# Patient Record
Sex: MALE | Race: WHITE | URBAN - METROPOLITAN AREA
[De-identification: names, ages, dates, MRNs, and addresses within clinical notes are randomized per-mention and may not be internally consistent; named-entity substitution may affect disease eponyms.]

---

## 2023-09-18 ENCOUNTER — OFFICE VISIT (OUTPATIENT)
Dept: FAMILY MEDICINE CLINIC | Age: 68
End: 2023-09-18

## 2023-09-18 VITALS
BODY MASS INDEX: 22.51 KG/M2 | OXYGEN SATURATION: 98 % | WEIGHT: 152 LBS | HEIGHT: 69 IN | DIASTOLIC BLOOD PRESSURE: 70 MMHG | HEART RATE: 78 BPM | SYSTOLIC BLOOD PRESSURE: 130 MMHG

## 2023-09-18 DIAGNOSIS — Z00.00 WELLNESS EXAMINATION: ICD-10-CM

## 2023-09-18 DIAGNOSIS — N18.6 ESRD (END STAGE RENAL DISEASE) (HCC): ICD-10-CM

## 2023-09-18 DIAGNOSIS — R33.9 URINARY RETENTION WITH INCOMPLETE BLADDER EMPTYING: Primary | ICD-10-CM

## 2023-09-18 DIAGNOSIS — I10 PRIMARY HYPERTENSION: ICD-10-CM

## 2023-09-18 PROCEDURE — 3078F DIAST BP <80 MM HG: CPT | Performed by: STUDENT IN AN ORGANIZED HEALTH CARE EDUCATION/TRAINING PROGRAM

## 2023-09-18 PROCEDURE — 1123F ACP DISCUSS/DSCN MKR DOCD: CPT | Performed by: STUDENT IN AN ORGANIZED HEALTH CARE EDUCATION/TRAINING PROGRAM

## 2023-09-18 PROCEDURE — 3075F SYST BP GE 130 - 139MM HG: CPT | Performed by: STUDENT IN AN ORGANIZED HEALTH CARE EDUCATION/TRAINING PROGRAM

## 2023-09-18 PROCEDURE — 99203 OFFICE O/P NEW LOW 30 MIN: CPT | Performed by: STUDENT IN AN ORGANIZED HEALTH CARE EDUCATION/TRAINING PROGRAM

## 2023-09-18 RX ORDER — SEVELAMER CARBONATE 800 MG/1
TABLET, FILM COATED ORAL
COMMUNITY
Start: 2023-09-09

## 2023-09-18 RX ORDER — NIFEDIPINE 90 MG/1
90 TABLET, FILM COATED, EXTENDED RELEASE ORAL DAILY
COMMUNITY
Start: 2023-09-14

## 2023-09-18 RX ORDER — TAMSULOSIN HYDROCHLORIDE 0.4 MG/1
CAPSULE ORAL DAILY
COMMUNITY
Start: 2023-09-14

## 2023-09-18 RX ORDER — CARVEDILOL 6.25 MG/1
6.25 TABLET ORAL 2 TIMES DAILY
COMMUNITY
Start: 2023-09-15

## 2023-09-18 ASSESSMENT — ANXIETY QUESTIONNAIRES
4. TROUBLE RELAXING: 1
GAD7 TOTAL SCORE: 1
6. BECOMING EASILY ANNOYED OR IRRITABLE: 0
1. FEELING NERVOUS, ANXIOUS, OR ON EDGE: 0
IF YOU CHECKED OFF ANY PROBLEMS ON THIS QUESTIONNAIRE, HOW DIFFICULT HAVE THESE PROBLEMS MADE IT FOR YOU TO DO YOUR WORK, TAKE CARE OF THINGS AT HOME, OR GET ALONG WITH OTHER PEOPLE: NOT DIFFICULT AT ALL
7. FEELING AFRAID AS IF SOMETHING AWFUL MIGHT HAPPEN: 0
5. BEING SO RESTLESS THAT IT IS HARD TO SIT STILL: 0
2. NOT BEING ABLE TO STOP OR CONTROL WORRYING: 0
3. WORRYING TOO MUCH ABOUT DIFFERENT THINGS: 0

## 2023-09-18 ASSESSMENT — PATIENT HEALTH QUESTIONNAIRE - PHQ9
2. FEELING DOWN, DEPRESSED OR HOPELESS: 1
1. LITTLE INTEREST OR PLEASURE IN DOING THINGS: 1
SUM OF ALL RESPONSES TO PHQ9 QUESTIONS 1 & 2: 2
SUM OF ALL RESPONSES TO PHQ QUESTIONS 1-9: 2

## 2023-09-18 ASSESSMENT — ENCOUNTER SYMPTOMS
SHORTNESS OF BREATH: 0
DIARRHEA: 0
CONSTIPATION: 0
COUGH: 0

## 2023-09-22 ENCOUNTER — CARE COORDINATION (OUTPATIENT)
Dept: CARE COORDINATION | Age: 68
End: 2023-09-22

## 2023-09-22 NOTE — CARE COORDINATION
ACM outreach to patient per PCP referral to offer CC and discuss the role of the ACM. Patient very friendly and notes he is doing well with no concerns. Has not picked up a BP monitor yet but plans to do so. Patient was asked to keep a record of recordings, take BP one hour after taking medication. Patient VU. Patient denied any HA, dizziness, CP, pressure, N/V. Taking medication as prescribed. Patient denied any needs or concerns to be addressed by ACM. Patient encouraged to outreach should any needs arise.

## 2023-12-05 ENCOUNTER — PATIENT MESSAGE (OUTPATIENT)
Dept: FAMILY MEDICINE CLINIC | Age: 68
End: 2023-12-05

## 2023-12-20 DIAGNOSIS — Z00.00 WELLNESS EXAMINATION: ICD-10-CM

## 2023-12-20 LAB
ALBUMIN SERPL-MCNC: 4.7 G/DL (ref 3.4–5)
ALBUMIN/GLOB SERPL: 1.8 {RATIO} (ref 1.1–2.2)
ALP SERPL-CCNC: 103 U/L (ref 40–129)
ALT SERPL-CCNC: 8 U/L (ref 10–40)
ANION GAP SERPL CALCULATED.3IONS-SCNC: 15 MMOL/L (ref 3–16)
AST SERPL-CCNC: 9 U/L (ref 15–37)
BILIRUB SERPL-MCNC: 0.3 MG/DL (ref 0–1)
BUN SERPL-MCNC: 33 MG/DL (ref 7–20)
CALCIUM SERPL-MCNC: 9.3 MG/DL (ref 8.3–10.6)
CHLORIDE SERPL-SCNC: 95 MMOL/L (ref 99–110)
CHOLEST SERPL-MCNC: 161 MG/DL (ref 0–199)
CO2 SERPL-SCNC: 26 MMOL/L (ref 21–32)
CREAT SERPL-MCNC: 5.5 MG/DL (ref 0.8–1.3)
GFR SERPLBLD CREATININE-BSD FMLA CKD-EPI: 11 ML/MIN/{1.73_M2}
GLUCOSE SERPL-MCNC: 97 MG/DL (ref 70–99)
HDLC SERPL-MCNC: 37 MG/DL (ref 40–60)
LDLC SERPL CALC-MCNC: 92 MG/DL
POTASSIUM SERPL-SCNC: 4.3 MMOL/L (ref 3.5–5.1)
PROT SERPL-MCNC: 7.3 G/DL (ref 6.4–8.2)
PSA SERPL DL<=0.01 NG/ML-MCNC: 11.97 NG/ML (ref 0–4)
SODIUM SERPL-SCNC: 136 MMOL/L (ref 136–145)
TRIGL SERPL-MCNC: 159 MG/DL (ref 0–150)
VLDLC SERPL CALC-MCNC: 32 MG/DL

## 2023-12-21 ENCOUNTER — TELEPHONE (OUTPATIENT)
Dept: FAMILY MEDICINE CLINIC | Age: 68
End: 2023-12-21

## 2023-12-21 LAB
EST. AVERAGE GLUCOSE BLD GHB EST-MCNC: 102.5 MG/DL
HBA1C MFR BLD: 5.2 %

## 2023-12-21 NOTE — TELEPHONE ENCOUNTER
Critical lab result from [facility]    Creatinine: 5.5     Please advise in PCP absence

## 2023-12-21 NOTE — TELEPHONE ENCOUNTER
There is no new concern about this piece of lab work. The patient has end-stage renal disease and is on dialysis and we often see these creatinine values in individuals on dialysis.

## 2023-12-26 ENCOUNTER — CLINICAL DOCUMENTATION (OUTPATIENT)
Dept: SPIRITUAL SERVICES | Age: 68
End: 2023-12-26

## 2023-12-26 ENCOUNTER — PATIENT MESSAGE (OUTPATIENT)
Dept: SPIRITUAL SERVICES | Age: 68
End: 2023-12-26

## 2023-12-26 NOTE — ACP (ADVANCE CARE PLANNING)
Advance Care Planning   Ambulatory ACP Specialist Patient Outreach    Date:  12/26/2023    ACP Specialist:  MARKIE Calderon    Outreach call to patient in follow-up to ACP Specialist referral Gilda Molina MD    [x] PCP  [x] Provider   [] Ambulatory Care Management [] Other     For:                  [x] Advance Directive Assistance              [] Complete Portable DNR order              [] Complete POST/POLST/MOST              [] Code Status Discussion             [] Discuss Goals of Care             [] Early ACP Decision-Making              [] Other (Specify)    Date Referral Received:12/22/2023    Next Step:   [] ACP scheduled conversation  [x] Outreach again in one week               [] Email / Mail [address]  [] Email / Mail Advance Directive   [] Closing referral.  Routing closure to referring provider/staff and to ACP Specialist . [] Closure letter mailed to patient with invitation to contact ACP Specialist if / when ready. [] Other (Specify here):         [x] At this time, Healthcare Decision Maker Is:  Advance Care Planning   Healthcare Decision Maker:    Primary Decision Maker: Daniel Burton [facility] - [phone]    Click here to complete Healthcare Decision Makers including selection of the Healthcare Decision Maker Relationship (ie \"Primary\"). [] Primary agent named in scanned advance directive. [x] Legal Next of Kin. [] Unable to determine legal decision maker at this time. Outreaches:       [x] 1st -  Date:  12/26/2023               Intervention:  [] Spoke with Patient   [] Left Voice mail [] Email / Mail    [x] MyChart  [x] Other (Specify): Line Disconnected After Answered      Outcomes: This Coordinator attempted to reach the pt as the listed primary phone number (mobile), were the line was answered and then disconnected. The listed home number is the pts daughter-in-law and was not called.  A [place] message has been sent offering

## 2023-12-28 ENCOUNTER — PATIENT MESSAGE (OUTPATIENT)
Dept: FAMILY MEDICINE CLINIC | Age: 68
End: 2023-12-28

## 2023-12-28 NOTE — TELEPHONE ENCOUNTER
[facility]   [address], [address], [address]   Phone: [phone]   Fax: [phone]     Called patient and made him aware of referral information and gave him phone number to call [facility]. Suggested he request appointment for first available, and explain his situation.

## 2024-01-10 DIAGNOSIS — Z01.818 PREOP TESTING: ICD-10-CM

## 2024-01-10 DIAGNOSIS — N18.6 END STAGE RENAL DISEASE (HCC): Primary | ICD-10-CM

## 2024-01-19 ENCOUNTER — TELEPHONE (OUTPATIENT)
Dept: VASCULAR SURGERY | Age: 69
End: 2024-01-19

## 2024-01-19 NOTE — TELEPHONE ENCOUNTER
Called patient as he did not show up for his vein mapping today. Need to reschedule vein mapping and apt to see Dr Hauser. Phillip

## 2024-01-19 NOTE — TELEPHONE ENCOUNTER
Pt's daughter-in-law called to reschedule pt for his vein mapping. Best callback number is [phone].       Please advise.

## 2024-01-22 ENCOUNTER — TELEPHONE (OUTPATIENT)
Dept: FAMILY MEDICINE CLINIC | Age: 69
End: 2024-01-22

## 2024-01-22 ENCOUNTER — OFFICE VISIT (OUTPATIENT)
Dept: FAMILY MEDICINE CLINIC | Age: 69
End: 2024-01-22
Payer: MEDICARE

## 2024-01-22 ENCOUNTER — TELEPHONE (OUTPATIENT)
Dept: VASCULAR SURGERY | Age: 69
End: 2024-01-22

## 2024-01-22 VITALS
SYSTOLIC BLOOD PRESSURE: 138 MMHG | TEMPERATURE: 97.8 F | HEIGHT: 69 IN | WEIGHT: 170.6 LBS | DIASTOLIC BLOOD PRESSURE: 74 MMHG | OXYGEN SATURATION: 97 % | BODY MASS INDEX: 25.27 KG/M2 | HEART RATE: 100 BPM

## 2024-01-22 DIAGNOSIS — B02.30 HERPES ZOSTER WITH OPHTHALMIC COMPLICATION, UNSPECIFIED HERPES ZOSTER EYE DISEASE: Primary | ICD-10-CM

## 2024-01-22 PROCEDURE — 1123F ACP DISCUSS/DSCN MKR DOCD: CPT | Performed by: STUDENT IN AN ORGANIZED HEALTH CARE EDUCATION/TRAINING PROGRAM

## 2024-01-22 PROCEDURE — 99214 OFFICE O/P EST MOD 30 MIN: CPT | Performed by: STUDENT IN AN ORGANIZED HEALTH CARE EDUCATION/TRAINING PROGRAM

## 2024-01-22 PROCEDURE — G8484 FLU IMMUNIZE NO ADMIN: HCPCS | Performed by: STUDENT IN AN ORGANIZED HEALTH CARE EDUCATION/TRAINING PROGRAM

## 2024-01-22 PROCEDURE — 3017F COLORECTAL CA SCREEN DOC REV: CPT | Performed by: STUDENT IN AN ORGANIZED HEALTH CARE EDUCATION/TRAINING PROGRAM

## 2024-01-22 PROCEDURE — 1036F TOBACCO NON-USER: CPT | Performed by: STUDENT IN AN ORGANIZED HEALTH CARE EDUCATION/TRAINING PROGRAM

## 2024-01-22 PROCEDURE — G8419 CALC BMI OUT NRM PARAM NOF/U: HCPCS | Performed by: STUDENT IN AN ORGANIZED HEALTH CARE EDUCATION/TRAINING PROGRAM

## 2024-01-22 PROCEDURE — G8427 DOCREV CUR MEDS BY ELIG CLIN: HCPCS | Performed by: STUDENT IN AN ORGANIZED HEALTH CARE EDUCATION/TRAINING PROGRAM

## 2024-01-22 RX ORDER — VALACYCLOVIR HYDROCHLORIDE 1 G/1
1000 TABLET, FILM COATED ORAL DAILY
Qty: 5 TABLET | Refills: 0 | Status: SHIPPED | OUTPATIENT
Start: 2024-01-22 | End: 2024-01-27

## 2024-01-22 ASSESSMENT — PATIENT HEALTH QUESTIONNAIRE - PHQ9
SUM OF ALL RESPONSES TO PHQ QUESTIONS 1-9: 2
1. LITTLE INTEREST OR PLEASURE IN DOING THINGS: 1
2. FEELING DOWN, DEPRESSED OR HOPELESS: 1
1. LITTLE INTEREST OR PLEASURE IN DOING THINGS: SEVERAL DAYS
SUM OF ALL RESPONSES TO PHQ QUESTIONS 1-9: 2
SUM OF ALL RESPONSES TO PHQ9 QUESTIONS 1 & 2: 2
SUM OF ALL RESPONSES TO PHQ QUESTIONS 1-9: 2
SUM OF ALL RESPONSES TO PHQ9 QUESTIONS 1 & 2: 2
SUM OF ALL RESPONSES TO PHQ QUESTIONS 1-9: 2
2. FEELING DOWN, DEPRESSED OR HOPELESS: SEVERAL DAYS

## 2024-01-22 ASSESSMENT — ENCOUNTER SYMPTOMS
CONSTIPATION: 0
COUGH: 0
SHORTNESS OF BREATH: 0
DIARRHEA: 0
FACIAL SWELLING: 1

## 2024-01-22 NOTE — PROGRESS NOTES
[facility]  [address],  [place] [address]    Assessment/Plan:    Milan was seen today for shoulder pain, otalgia and other.    Diagnoses and all orders for this visit:    Herpes zoster with ophthalmic complication, unspecified herpes zoster eye disease  -     Cancel: Mazin Ferrari MD, Ophthalmology, [facility]  -     valACYclovir (VALTREX) 1 g tablet; Take 1 tablet by mouth daily for 5 days  -     Mazin Ferrari MD, Ophthalmology, [facility]    Patient with clinical findings concerning for herpes zoster with possible ophthalmic complication given dermatomal distribution and history.  Patient high risk given end-stage renal disease on dialysis.  Provided appropriate medical education for the above condition in addition to urgency for treatment and follow-up with ophthalmology given risk of vision loss.  Antibiotic treatment dosed per renal function.  Strongly encourage patient to reach out to nephrologist for any additional titrations to medication dose if needed.  For now last GFR of 11 was used from patient's blood work in December 2023.  Patient to have close follow-up to monitor progress.    Return in about 2 weeks (around 2/5/2024) for Return for Shoulder, Hips, Scapula, Med check. Left shoulder pain. Bilateral hips (L>R),.     Patient: Milan Peres is a pleasant 68 y.o.male who presents today for:      Chief Complaint   Patient presents with    Shoulder Pain     Right shoulder pain    Otalgia     Right ear pain x 1 day    Other     Rash on head, and right eye swollen, started this a.m.       HPI:     Left scalp and face pain:  Patient reports symptoms of left scalp rash.  Symptoms began approximately 2-3 days ago.  Reports associated complaints of  shooting pain behind the ear and maxillary area. Also with throat irritation and facial swelling. Denies any symptoms of vision changes though with swelling to right periorbital area.    Patient's past

## 2024-01-22 NOTE — TELEPHONE ENCOUNTER
Called and spoke to Hayley daughter-n-law regarding rescheduling apt for vm and apt to see Dr Hauser. Apt for vein mapping is Friday 2/2/25 at [facility] at 9:00am and apt to see Dr Hauser is 10:15am. Phillip

## 2024-01-22 NOTE — TELEPHONE ENCOUNTER
Called [facility] per Dr Morgan, requesting patient be seen today for Shingles onset, right eye.  Spoke to Minda @ [phone] and gave patient information and faxed referral with [facility] to [phone]. Made patient aware, triage Rn would be contacting him to schedule for first available.

## 2024-01-23 ENCOUNTER — TELEPHONE (OUTPATIENT)
Dept: FAMILY MEDICINE CLINIC | Age: 69
End: 2024-01-23

## 2024-01-23 DIAGNOSIS — B02.30 HERPES ZOSTER WITH OPHTHALMIC COMPLICATION, UNSPECIFIED HERPES ZOSTER EYE DISEASE: Primary | ICD-10-CM

## 2024-01-23 RX ORDER — TRAMADOL HYDROCHLORIDE 50 MG/1
50 TABLET ORAL EVERY 12 HOURS PRN
Qty: 14 TABLET | Refills: 0 | Status: SHIPPED | OUTPATIENT
Start: 2024-01-23 | End: 2024-01-30

## 2024-01-23 RX ORDER — LIDOCAINE 3% TOPICAL ANESTHETIC CREAM 0.03 G/G
1 CREAM TOPICAL 3 TIMES DAILY
Qty: 35 G | Refills: 0 | Status: SHIPPED | OUTPATIENT
Start: 2024-01-23 | End: 2024-02-06

## 2024-01-23 NOTE — TELEPHONE ENCOUNTER
Hayley called back, she states she is a pharmaceutical technician, and has used some of her own lidocaine and acyclovir ointment and mixed together and applied topically for patient, to help with pain. The ointment did give him some relief, so asking for rx for patient. She states he is in a lot of pain, and usually \"never complains\".  Tylenol is not helping, cannot take ibuprofen.   [facility]

## 2024-01-23 NOTE — TELEPHONE ENCOUNTER
Spoke to patient's daughter-in-law.  Will send in prescription for tramadol and lidocaine topical for pain control.  Tramadol renally adjusted to be taken every 12 hours.  Patient daughter-in-law reported that patient was seen by ophthalmology yesterday and advised eye symptoms monitoring for now with ED precautions provided.  Thanks!

## 2024-01-23 NOTE — TELEPHONE ENCOUNTER
Pt's daughter in law, Hayley called to ask if Dr. Morgan will call in a RX for lidocaine and Acyclovir. Pt is suffering from a terrible bout of shingles. Hayley stated that the shingles have gotten into his eye, and he is in a lot of pain due to this. Hayley is also asking for some pain meds for Milan. She stated that he is in end stage renal failure, and there is a med he is on for that, Renvela,  that she feels he would benefit from if it was a stronger dose. Hayley is a pharmacist and asks for a phone call to explain in more detail if needed. [phone].

## 2024-02-02 ENCOUNTER — TELEPHONE (OUTPATIENT)
Dept: FAMILY MEDICINE CLINIC | Age: 69
End: 2024-02-02

## 2024-02-02 DIAGNOSIS — Z00.00 ENCOUNTER FOR SCREENING AND PREVENTATIVE CARE: ICD-10-CM

## 2024-02-02 RX ORDER — ATORVASTATIN CALCIUM 20 MG/1
20 TABLET, FILM COATED ORAL DAILY
Qty: 90 TABLET | Refills: 3 | Status: SHIPPED | OUTPATIENT
Start: 2024-02-02

## 2024-02-02 NOTE — TELEPHONE ENCOUNTER
Last Office Visit  -  1/22/24  Next Office Visit  -  2/5/24    Last Filled  -  12/22/23  Last UDS -    Contract -

## 2024-02-02 NOTE — TELEPHONE ENCOUNTER
Nolvia from [facility] called requesting a refill on this medication - please add refills:    atorvastatin (LIPITOR) 20 MG tablet [[id number]      [facility]/pharmacy #[id number] - [place], [place] - [address] - P [phone] - F [phone] [address] [facility] [address]   Phone:  [phone]  Fax:  [phone]

## 2024-02-05 ENCOUNTER — OFFICE VISIT (OUTPATIENT)
Dept: FAMILY MEDICINE CLINIC | Age: 69
End: 2024-02-05
Payer: COMMERCIAL

## 2024-02-05 VITALS
DIASTOLIC BLOOD PRESSURE: 82 MMHG | SYSTOLIC BLOOD PRESSURE: 130 MMHG | OXYGEN SATURATION: 98 % | WEIGHT: 170 LBS | HEART RATE: 102 BPM | HEIGHT: 69 IN | BODY MASS INDEX: 25.18 KG/M2

## 2024-02-05 DIAGNOSIS — M70.62 GREATER TROCHANTERIC BURSITIS OF LEFT HIP: Primary | ICD-10-CM

## 2024-02-05 DIAGNOSIS — B02.30 HERPES ZOSTER WITH OPHTHALMIC COMPLICATION, UNSPECIFIED HERPES ZOSTER EYE DISEASE: ICD-10-CM

## 2024-02-05 PROCEDURE — 99214 OFFICE O/P EST MOD 30 MIN: CPT | Performed by: STUDENT IN AN ORGANIZED HEALTH CARE EDUCATION/TRAINING PROGRAM

## 2024-02-05 PROCEDURE — 1123F ACP DISCUSS/DSCN MKR DOCD: CPT | Performed by: STUDENT IN AN ORGANIZED HEALTH CARE EDUCATION/TRAINING PROGRAM

## 2024-02-05 RX ORDER — LIDOCAINE HYDROCHLORIDE 30 MG/G
CREAM TOPICAL 3 TIMES DAILY
COMMUNITY
Start: 2024-01-30

## 2024-02-05 ASSESSMENT — ENCOUNTER SYMPTOMS
COUGH: 0
DIARRHEA: 0
CONSTIPATION: 0
SHORTNESS OF BREATH: 0

## 2024-02-05 NOTE — PROGRESS NOTES
[facility]  [address],  [place] [address]    Assessment/Plan:    Milan was seen today for hip pain, shoulder pain and medication check.    Diagnoses and all orders for this visit:    Greater trochanteric bursitis of left hip  Mild to moderate symptoms of greater trochanteric pain syndrome that affecting patient's quality of life.  Discussed that we will schedule appointment for corticosteroid injection to left hip which bothers patient more.  > 10 minutes spent discussing pathophysiology of above medical condition and appropriate treatment options.    Herpes zoster with ophthalmic complication, unspecified herpes zoster eye disease  Symptoms almost completely resolved except with mild headache.  Completed antiviral.  Pain controlled without pain medication.  No further treatment at this time.    Return for Procedure. CSI injection.     Patient: Milan Peres is a pleasant 68 y.o.male who presents today for:      Chief Complaint   Patient presents with    Hip Pain     bilateral    Shoulder Pain     left    Medication Check       HPI:     Follow-up for shingles :  Since last visit seen by ophthalmology and no new medication . Has completed antiviral and not using tramadol or lidocaine topical anymore. Minimal headache otherwise resolving.  Reports no new symptoms or concerns at this time  .      Bilateral (L>R) hip pain:  Patient reports symptoms of hip pain with with walking or pushing a cart.  Localizes pain to latearl hip area area.  Pain began approximately > 20 years ago. Never seen by health care provider for this. Characterizes pain as burning. Rates pain at 0/10 at rest and 4/10 with activity and may progress to 6-8/10 if \"pushes it.  Denies any symptoms of groin pain or significant back.  Patient reports has attempted behavior modification treatments.  Aggravating factors include walking activity and driving \"stick shift\" . No known trauma to area.

## 2024-02-12 ENCOUNTER — HOSPITAL ENCOUNTER (OUTPATIENT)
Dept: VASCULAR LAB | Age: 69
Discharge: HOME | End: 2024-02-12
Attending: SURGERY
Payer: COMMERCIAL

## 2024-02-12 ENCOUNTER — TELEPHONE (OUTPATIENT)
Dept: FAMILY MEDICINE CLINIC | Age: 69
End: 2024-02-12

## 2024-02-12 DIAGNOSIS — N18.6 END STAGE RENAL DISEASE (HCC): ICD-10-CM

## 2024-02-12 DIAGNOSIS — Z01.818 PREOP TESTING: ICD-10-CM

## 2024-02-12 PROCEDURE — 93985 DUP-SCAN HEMO COMPL BI STD: CPT

## 2024-02-12 NOTE — TELEPHONE ENCOUNTER
Lidia, vascular , called to inform Dr Morgan of some incidental findings seen while pt was getting VL Non invasive vessel mapping prior to hemodialysis access done today.    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  Test was ordered by Dr Hauser and his office has been contacted also. There will be a preliminary result in [facility] later today.

## 2024-02-16 ENCOUNTER — OFFICE VISIT (OUTPATIENT)
Dept: VASCULAR SURGERY | Age: 69
End: 2024-02-16

## 2024-02-16 VITALS
HEIGHT: 69 IN | WEIGHT: 168.5 LBS | BODY MASS INDEX: 24.96 KG/M2 | SYSTOLIC BLOOD PRESSURE: 138 MMHG | DIASTOLIC BLOOD PRESSURE: 82 MMHG

## 2024-02-16 DIAGNOSIS — Z99.2 ENCOUNTER REGARDING VASCULAR ACCESS FOR DIALYSIS FOR ESRD (HCC): Primary | ICD-10-CM

## 2024-02-16 DIAGNOSIS — N18.6 ENCOUNTER REGARDING VASCULAR ACCESS FOR DIALYSIS FOR ESRD (HCC): Primary | ICD-10-CM

## 2024-02-16 NOTE — PROGRESS NOTES
Outpatient Consultation / H&P    Date of Consultation:  2/16/2024    PCP:  Abilio Morgan Jr., MD     Referring Provider:  Dr. Matt     Chief Complaint:   Chief Complaint   Patient presents with    Other     Patient is here to discuss hemodialysis access.pamlr        History of Present Illness:   We are asked to see this patient in consultation by Dr. Matt regarding dialysis access.    Milan Peres is a 68 y.o. male who has ESRD oh HD via a tunneled catheter.    He is right hand dominant.       Past Medical History:  Past Medical History:   Diagnosis Date    Chronic indwelling Moss catheter     ESRD (end stage renal disease) (HCC)     Herpes zoster with ophthalmic complication     HTN (hypertension)     Urinary retention        Past Surgical History:  History reviewed. No pertinent surgical history.    Home Medications:   Prior to Admission medications    Medication Sig Start Date End Date Taking? Authorizing Provider   lidocaine 3 % topical cream Apply topically 3 times daily 1/30/24  Yes Provider, MD Velma   atorvastatin (LIPITOR) 20 MG tablet Take 1 tablet by mouth daily 2/2/24  Yes Abilio Morgan Jr., MD   carvedilol (COREG) 6.25 MG tablet Take 1 tablet by mouth 2 times daily 9/15/23  Yes Provider, MD Velma   NIFEdipine (ADALAT CC) 90 MG extended release tablet Take 1 tablet by mouth daily 9/14/23  Yes Provider, MD eVlma   sevelamer (RENVELA) 800 MG tablet TAKE 1 TABLET BY MOUTH THREE TIMES A DAY BEFORE MEALS 9/9/23  Yes Provider, MD Velma   tamsulosin (FLOMAX) 0.4 MG capsule Take by mouth daily 9/14/23  Yes Provider, MD Velma        Allergies:  Patient has no known allergies.      Social History:      Social History     Socioeconomic History    Marital status: Unknown     Spouse name: Not on file    Number of children: Not on file    Years of education: Not on file    Highest education level: Not on file   Occupational History    Not on file   Tobacco Use

## 2024-02-20 ENCOUNTER — PREP FOR PROCEDURE (OUTPATIENT)
Dept: VASCULAR SURGERY | Age: 69
End: 2024-02-20

## 2024-02-20 DIAGNOSIS — Z01.818 PREOP TESTING: Primary | ICD-10-CM

## 2024-02-20 DIAGNOSIS — N18.6 END STAGE RENAL DISEASE (HCC): ICD-10-CM

## 2024-02-20 RX ORDER — SODIUM CHLORIDE 9 MG/ML
INJECTION, SOLUTION INTRAVENOUS PRN
Status: CANCELLED | OUTPATIENT
Start: 2024-02-20

## 2024-02-20 RX ORDER — SODIUM CHLORIDE 0.9 % (FLUSH) 0.9 %
5-40 SYRINGE (ML) INJECTION EVERY 12 HOURS SCHEDULED
Status: CANCELLED | OUTPATIENT
Start: 2024-02-20

## 2024-02-20 RX ORDER — SODIUM CHLORIDE 0.9 % (FLUSH) 0.9 %
5-40 SYRINGE (ML) INJECTION PRN
Status: CANCELLED | OUTPATIENT
Start: 2024-02-20

## 2024-02-20 RX ORDER — FERRIC CITRATE 210 MG/1
210 TABLET, COATED ORAL
COMMUNITY

## 2024-02-20 NOTE — PROGRESS NOTES
Milan White    Age 68 y.o.    male    1955    MRN [id number]    2/28/2024  Arrival Time_____________  OR Time____________115 Min     Procedure(s):  LEFT RADIAL CEPHALIC ARTERIAL VENOUS  FISTULA CREATION                      General   Surgeon(s):  Simon Hauser, MD      DAY ADMIT ___  SDS/OP ___  OUTPT IN BED ___        Phone [phone] (home) [phone] (work)    PCP _____________________ Phone_________________ Epic ( ) Epic CE ( ) Appt ________    ADDITIONAL INFO __________________________________ Cardio/Consult _____________    NOTES _____________________________________________________________________    ____________________________________________________________________________    PAT APPT DATE:________ TIME: ________  FAXED QAD: _______  (__) H&P w/ Hospitalist  __________________________________________________________________________  Preop Nurse phone screen complete: _____________  (__) CBC     (__) W/ DIFF ___________     (__) Hgb A1C    ___________  (__) CHEST X RAY   __________  (__) LIPID PROFILE  ___________  (__) EKG   __________  (__) PT-INR / APTT  ___________  (__) PFT's   __________  (__) BMP   ___________  (__) CAROTIDS  __________  (__) CMP   ___________  (__) VEIN MAPPING  __________  (__) U/A   ___________  (__) HISTORY & PHYSICAL __________  (__) URINE C & S  ___________  (__) CARDIAC CLEARANCE __________  (__) U/A W/ FLEX  ___________  (__) PULM. CLEARANCE __________  (__) SERUM PREGNANCY ___________  (__) Check Epic DOS orders __________  (__) TYPE & SCREEN __________repeat ( ) (__)  __________________ __________  (__) Albumin / Prealbumin ___________  (__)  __________________ __________  (__) TRANSFERRIN  ___________  (__)  __________________ __________  (__) LIVER PROFILE  ___________  (__)  __________________ __________  (__) MRSA NASAL SWAB ___________  (__) URINE PREG DOS __________  (__) SED RATE  ___________  (__) BLOOD SUGAR DOS __________  (__) C-REACTIVE

## 2024-02-21 ENCOUNTER — PROCEDURE VISIT (OUTPATIENT)
Dept: FAMILY MEDICINE CLINIC | Age: 69
End: 2024-02-21

## 2024-02-21 VITALS
SYSTOLIC BLOOD PRESSURE: 124 MMHG | TEMPERATURE: 97.5 F | HEIGHT: 69 IN | DIASTOLIC BLOOD PRESSURE: 70 MMHG | WEIGHT: 167 LBS | HEART RATE: 112 BPM | RESPIRATION RATE: 14 BRPM | BODY MASS INDEX: 24.73 KG/M2 | OXYGEN SATURATION: 98 %

## 2024-02-21 DIAGNOSIS — M70.62 GREATER TROCHANTERIC BURSITIS OF LEFT HIP: Primary | ICD-10-CM

## 2024-02-21 RX ORDER — LIDOCAINE HYDROCHLORIDE 20 MG/ML
4 INJECTION, SOLUTION INFILTRATION; PERINEURAL ONCE
Status: SHIPPED | OUTPATIENT
Start: 2024-02-21

## 2024-02-21 RX ORDER — METHYLPREDNISOLONE ACETATE 40 MG/ML
40 INJECTION, SUSPENSION INTRA-ARTICULAR; INTRALESIONAL; INTRAMUSCULAR; SOFT TISSUE ONCE
Qty: 1 ML | Refills: 0
Start: 2024-02-21 | End: 2024-02-21 | Stop reason: CLARIF

## 2024-02-21 RX ORDER — METHYLPREDNISOLONE ACETATE 40 MG/ML
40 INJECTION, SUSPENSION INTRA-ARTICULAR; INTRALESIONAL; INTRAMUSCULAR; SOFT TISSUE ONCE
Status: COMPLETED | OUTPATIENT
Start: 2024-02-21 | End: 2024-02-21

## 2024-02-21 RX ADMIN — METHYLPREDNISOLONE ACETATE 40 MG: 40 INJECTION, SUSPENSION INTRA-ARTICULAR; INTRALESIONAL; INTRAMUSCULAR; SOFT TISSUE at 16:42

## 2024-02-21 ASSESSMENT — ENCOUNTER SYMPTOMS
SHORTNESS OF BREATH: 0
COUGH: 0
CONSTIPATION: 0
DIARRHEA: 0

## 2024-02-21 NOTE — PROGRESS NOTES
Surgery Date and Time: 2/28/24 @ 09:30 am   Arrival Time:  07:30 am    The instructions given when and if a patient needs to stop oral intake prior to surgery varies. Follow the instructions you were given by your    Surgeon or RN during the Pre-op call.       __X__Nothing to eat or to drink after Midnight the night before the surgery. NO gum, mints, candy or ice chips day of surgery.                  Only take the following medications with a small sip of water the morning of surgery:  carvedilol           Aspirin, Ibuprofen, Advil, Naproxen, Vitamin E and other Anti-inflammatory products and supplements should be stopped for 5 -7days before surgery      or as directed by your physician.         - Do not smoke or vape, and do not drink any alcoholic beverages 24 hours prior to surgery, this includes NA Beer. Refrain from using any recreational drugs,     including non-prescribed prescription drugs.     -You may brush your teeth and gargle the morning of surgery.  DO NOT SWALLOW WATER.    -You MUST plan for a responsible adult to stay on site while you are here and take you home after your surgery. You will not be allowed to leave alone or drive               yourself home. It is requested someone stay with you the first 24 hrs. Your surgery will be cancelled if you do not have a ride home with a responsible adult.    -A parent/legal guardian must accompany a child scheduled for surgery and plan to stay at the hospital until the child is discharged.  Please do not bring other                children with you.    -Please wear simple, loose-fitting clothing to the hospital. Do not bring valuables (money, credit cards, checkbooks, etc.) Do not wear any makeup (including                no eye makeup) and no nail polish if applicable.             - DO NOT wear any jewelry or body piercings day of surgery.  All body piercing jewelry must be removed.             - If you have dentures they will be removed before going to

## 2024-02-21 NOTE — PROGRESS NOTES
[facility]  [address],  [place] [address]    Assessment/Plan:    Milan was seen today for injections.    Diagnoses and all orders for this visit:    Greater trochanteric bursitis of left hip  -     lidocaine 2 % injection 4 mL  -     methylPREDNISolone acetate (DEPO-MEDROL) injection 40 mg    Patient presents for greater trochanteric bursa steroid injection.  See procedure note below for details.    PRE-OP DIAGNOSIS: Greater trochanteric pain syndrome  POST-OP DIAGNOSIS: Same   PROCEDURE: Greater trochanteric bursa joint injection    The patient was placed in the lateral recumbent position with the affected hip up. The hip and knee were flexed to 30 to 50 degrees and 60 to 90 degrees, respectively. The greater trochanter was palpated, and the injection site located at the point of maximal tenderness. The skin was prepped with betadine and draped in routine sterile fashion. A  22-gauge, 1.5-inch needle was inserted perpendicular to the skin directly toward the greater trochanter and then withdrawn 2 to 3 mm. A 1cc 40mg methylprednisolone and 4cc 2% lidocaine mixture was injected and flowed easily.     Patient was advised to avoid strenuous activity for several days. Patients advised that a steroid flare-up may occur in the first 24 to 48 hours after injection however patient should make provider aware of any worsening more than 72 to 96 hours. Patient to have followup in 2-4 weeks postinjection.     Return for Procedure. R Hip CSI. F/ in 4 weeks for R hip CSI. Scapula,Left shoulder pain.       Patient: Milan Peres is a pleasant 68 y.o.male who presents today for:      Chief Complaint   Patient presents with    Injections     Left greater trochanteric        HPI:     Bilateral (L>R) hip pain:  Patient reports symptoms of hip pain with with walking or pushing a cart.  Localizes pain to latearl hip area area.  Pain began approximately > 20 years ago. Never seen by health

## 2024-02-24 ENCOUNTER — HOSPITAL ENCOUNTER (EMERGENCY)
Age: 69
Discharge: HOME | End: 2024-02-24
Attending: EMERGENCY MEDICINE
Payer: COMMERCIAL

## 2024-02-24 ENCOUNTER — APPOINTMENT (OUTPATIENT)
Dept: CT IMAGING | Age: 69
End: 2024-02-24
Payer: COMMERCIAL

## 2024-02-24 VITALS
OXYGEN SATURATION: 100 % | RESPIRATION RATE: 18 BRPM | HEIGHT: 69 IN | DIASTOLIC BLOOD PRESSURE: 87 MMHG | WEIGHT: 169.4 LBS | SYSTOLIC BLOOD PRESSURE: 169 MMHG | TEMPERATURE: 99 F | HEART RATE: 90 BPM | BODY MASS INDEX: 25.09 KG/M2

## 2024-02-24 DIAGNOSIS — N13.30 HYDRONEPHROSIS, UNSPECIFIED HYDRONEPHROSIS TYPE: Primary | ICD-10-CM

## 2024-02-24 DIAGNOSIS — N18.9 CHRONIC KIDNEY DISEASE, UNSPECIFIED CKD STAGE: ICD-10-CM

## 2024-02-24 DIAGNOSIS — N28.9 RENAL LESION: ICD-10-CM

## 2024-02-24 DIAGNOSIS — N30.01 ACUTE CYSTITIS WITH HEMATURIA: ICD-10-CM

## 2024-02-24 LAB
ALBUMIN SERPL-MCNC: 4.4 G/DL (ref 3.4–5)
ALBUMIN/GLOB SERPL: 1.4 {RATIO} (ref 1.1–2.2)
ALP SERPL-CCNC: 80 U/L (ref 40–129)
ALT SERPL-CCNC: 9 U/L (ref 10–40)
ANION GAP SERPL CALCULATED.3IONS-SCNC: 15 MMOL/L (ref 3–16)
AST SERPL-CCNC: 8 U/L (ref 15–37)
BASOPHILS # BLD: 0.1 K/UL (ref 0–0.2)
BASOPHILS NFR BLD: 0.8 %
BILIRUB SERPL-MCNC: 0.3 MG/DL (ref 0–1)
BILIRUB UR QL STRIP.AUTO: NEGATIVE
BUN SERPL-MCNC: 37 MG/DL (ref 7–20)
CALCIUM SERPL-MCNC: 9.3 MG/DL (ref 8.3–10.6)
CHARACTER UR: ABNORMAL
CHLORIDE SERPL-SCNC: 97 MMOL/L (ref 99–110)
CLARITY UR: ABNORMAL
CO2 SERPL-SCNC: 25 MMOL/L (ref 21–32)
COLOR UR: YELLOW
CREAT SERPL-MCNC: 6.4 MG/DL (ref 0.8–1.3)
DEPRECATED RDW RBC AUTO: 14.6 % (ref 12.4–15.4)
EKG ATRIAL RATE: 96 BPM
EKG DIAGNOSIS: NORMAL
EKG P AXIS: 79 DEGREES
EKG P-R INTERVAL: 134 MS
EKG Q-T INTERVAL: 364 MS
EKG QRS DURATION: 80 MS
EKG QTC CALCULATION (BAZETT): 459 MS
EKG R AXIS: 55 DEGREES
EKG T AXIS: 78 DEGREES
EKG VENTRICULAR RATE: 96 BPM
EOSINOPHIL # BLD: 0 K/UL (ref 0–0.6)
EOSINOPHIL NFR BLD: 0.5 %
GFR SERPLBLD CREATININE-BSD FMLA CKD-EPI: 9 ML/MIN/{1.73_M2}
GLUCOSE SERPL-MCNC: 109 MG/DL (ref 70–99)
GLUCOSE UR STRIP.AUTO-MCNC: NEGATIVE MG/DL
HCT VFR BLD AUTO: 38 % (ref 40.5–52.5)
HGB BLD-MCNC: 12.8 G/DL (ref 13.5–17.5)
HGB UR QL STRIP.AUTO: ABNORMAL
KETONES UR STRIP.AUTO-MCNC: NEGATIVE MG/DL
LACTATE BLDV-SCNC: 1.5 MMOL/L (ref 0.4–1.9)
LEUKOCYTE ESTERASE UR QL STRIP.AUTO: ABNORMAL
LIPASE SERPL-CCNC: 33 U/L (ref 13–60)
LYMPHOCYTES # BLD: 0.8 K/UL (ref 1–5.1)
LYMPHOCYTES NFR BLD: 9.4 %
MCH RBC QN AUTO: 33.3 PG (ref 26–34)
MCHC RBC AUTO-ENTMCNC: 33.8 G/DL (ref 31–36)
MCV RBC AUTO: 98.6 FL (ref 80–100)
MONOCYTES # BLD: 1 K/UL (ref 0–1.3)
MONOCYTES NFR BLD: 10.9 %
NEUTROPHILS # BLD: 7.1 K/UL (ref 1.7–7.7)
NEUTROPHILS NFR BLD: 78.4 %
NITRITE UR QL STRIP.AUTO: POSITIVE
PH UR STRIP.AUTO: 7 [PH] (ref 5–8)
PLATELET # BLD AUTO: 304 K/UL (ref 135–450)
PMV BLD AUTO: 7 FL (ref 5–10.5)
POTASSIUM SERPL-SCNC: 4.1 MMOL/L (ref 3.5–5.1)
PROT SERPL-MCNC: 7.5 G/DL (ref 6.4–8.2)
PROT UR STRIP.AUTO-MCNC: >=300 MG/DL
RBC # BLD AUTO: 3.85 M/UL (ref 4.2–5.9)
RBC #/AREA URNS HPF: ABNORMAL /HPF (ref 0–4)
SODIUM SERPL-SCNC: 137 MMOL/L (ref 136–145)
SP GR UR STRIP.AUTO: 1.02 (ref 1–1.03)
UA COMPLETE W REFLEX CULTURE PNL UR: YES
UA DIPSTICK W REFLEX MICRO PNL UR: YES
URN SPEC COLLECT METH UR: ABNORMAL
UROBILINOGEN UR STRIP-ACNC: 0.2 E.U./DL
WBC # BLD AUTO: 9 K/UL (ref 4–11)
WBC #/AREA URNS HPF: >100 /HPF (ref 0–5)

## 2024-02-24 PROCEDURE — 99284 EMERGENCY DEPT VISIT MOD MDM: CPT

## 2024-02-24 PROCEDURE — 83605 ASSAY OF LACTIC ACID: CPT

## 2024-02-24 PROCEDURE — 81001 URINALYSIS AUTO W/SCOPE: CPT

## 2024-02-24 PROCEDURE — 87186 SC STD MICRODIL/AGAR DIL: CPT

## 2024-02-24 PROCEDURE — 85025 COMPLETE CBC W/AUTO DIFF WBC: CPT

## 2024-02-24 PROCEDURE — 87077 CULTURE AEROBIC IDENTIFY: CPT

## 2024-02-24 PROCEDURE — 74176 CT ABD & PELVIS W/O CONTRAST: CPT

## 2024-02-24 PROCEDURE — 93010 ELECTROCARDIOGRAM REPORT: CPT | Performed by: INTERNAL MEDICINE

## 2024-02-24 PROCEDURE — 51702 INSERT TEMP BLADDER CATH: CPT

## 2024-02-24 PROCEDURE — 93005 ELECTROCARDIOGRAM TRACING: CPT | Performed by: EMERGENCY MEDICINE

## 2024-02-24 PROCEDURE — 80053 COMPREHEN METABOLIC PANEL: CPT

## 2024-02-24 PROCEDURE — 83690 ASSAY OF LIPASE: CPT

## 2024-02-24 PROCEDURE — 87086 URINE CULTURE/COLONY COUNT: CPT

## 2024-02-24 RX ORDER — CEPHALEXIN 500 MG/1
500 CAPSULE ORAL 2 TIMES DAILY
Qty: 14 CAPSULE | Refills: 0 | Status: SHIPPED | OUTPATIENT
Start: 2024-02-24 | End: 2024-03-02

## 2024-02-24 ASSESSMENT — LIFESTYLE VARIABLES
HOW OFTEN DO YOU HAVE A DRINK CONTAINING ALCOHOL: NEVER
HOW MANY STANDARD DRINKS CONTAINING ALCOHOL DO YOU HAVE ON A TYPICAL DAY: PATIENT DOES NOT DRINK

## 2024-02-24 ASSESSMENT — PAIN DESCRIPTION - PAIN TYPE: TYPE: ACUTE PAIN

## 2024-02-24 ASSESSMENT — PAIN DESCRIPTION - DESCRIPTORS: DESCRIPTORS: SHOOTING

## 2024-02-24 ASSESSMENT — PAIN - FUNCTIONAL ASSESSMENT: PAIN_FUNCTIONAL_ASSESSMENT: 0-10

## 2024-02-24 ASSESSMENT — PAIN DESCRIPTION - LOCATION: LOCATION: ABDOMEN

## 2024-02-24 ASSESSMENT — PAIN DESCRIPTION - FREQUENCY: FREQUENCY: CONTINUOUS

## 2024-02-24 ASSESSMENT — PAIN SCALES - GENERAL: PAINLEVEL_OUTOF10: 6

## 2024-02-24 NOTE — ED NOTES
Spoke with Abhinav from CT regarding concern for contrast with patient.  Patient is a dialysis patient, attends on Saturday Tuesday and Thursday.   Pt was on his way to dialysis this morning but came to ED instead due to abdominal pain.  Pt states his dialysis center closes @ 1200 and he is concerned he will not be able to make his appointment until next Tuesday.  Dr. Wise aware and notified of critical creatinine; verbal order to change CT abdomen with IV contrast to CT abdomen WO contrast.

## 2024-02-24 NOTE — ED PROVIDER NOTES
Emergency Department Provider Note  Location: [facility]  ED  2/24/2024     Patient Identification  Milan Peres is a 68 y.o. male    Chief Complaint  Abdominal Pain (RLQ pain began last night, progressively getting worse. /Denies any n/v/d.)          HPI  (History provided by {Persons; family members:[id number]})    ***      Nursing Notes were all reviewed and agreed with, or any disagreements were addressed in the HPI:  Allergies: No Known Allergies    Past medical history:  has a past medical history of Chronic indwelling Moss catheter, COPD (chronic obstructive pulmonary disease) ([facility]), Dependence on renal dialysis ([facility]), ESRD (end stage renal disease) ([facility]), Former smoker, Hemodialysis patient ([facility]), Herpes zoster with ophthalmic complication, Hip pain, History of blood transfusion, HTN (hypertension), Hyperlipidemia, Shingles, and Urinary retention.    Past surgical history:  has a past surgical history that includes Dialysis Catheter Insertion and Port Surgery.    Home medications:   Prior to Admission medications    Medication Sig Start Date End Date Taking? Authorizing Provider   ferric citrate (AURYXIA) 1  MG(Fe) TABS tablet Take 1 tablet by mouth 3 times daily (with meals)    Velma Meza MD   atorvastatin (LIPITOR) 20 MG tablet Take 1 tablet by mouth daily  Patient taking differently: Take 1 tablet by mouth at bedtime 2/2/24   Abilio Morgan Jr., MD   carvedilol (COREG) 6.25 MG tablet Take 1 tablet by mouth 2 times daily 9/15/23   Velma Meza MD   NIFEdipine (ADALAT CC) 90 MG extended release tablet Take 1 tablet by mouth at bedtime 9/14/23   Velma Meza MD   tamsulosin (FLOMAX) 0.4 MG capsule Take by mouth daily 9/14/23   Velma Meza MD       Social history:  reports that he quit smoking about 8 months ago. His smoking use included cigarettes. He started smoking about 50 years ago. He has a 74.6 pack-year smoking history. He has never used

## 2024-02-24 NOTE — ED NOTES
Verbal order by Dr. Wise to replace patient indwelling han catheter.  Han balloon deflated with 10 cc and removed from patient.  RN inserted new 18 fr han catheter. Sterile technique used, urine returned immediately upon insertion.  Patient standard han bag removed and leg bag placed. Patient states comfort after procedure.

## 2024-02-24 NOTE — ED NOTES
Patient discharged from ED with all personal belongings and discharge paperwork.  Patient educated on prescribed abx and importance of finishing all prescribed abx.  Patient informed to follow up with dialysis to reschedule missed appointment this morning and attempt to get in as soon as possible.  Patient verbalized understanding. Patient ambulated out of ED without any complications. No acute s/s of distress noted.

## 2024-02-25 LAB
BACTERIA UR CULT: ABNORMAL
BACTERIA UR CULT: ABNORMAL
ORGANISM: ABNORMAL

## 2024-02-26 LAB
BACTERIA UR CULT: ABNORMAL
BACTERIA UR CULT: ABNORMAL
ORGANISM: ABNORMAL

## 2024-02-27 ENCOUNTER — ANESTHESIA EVENT (OUTPATIENT)
Dept: OPERATING ROOM | Age: 69
End: 2024-02-27
Payer: COMMERCIAL

## 2024-02-28 ENCOUNTER — ANESTHESIA (OUTPATIENT)
Dept: OPERATING ROOM | Age: 69
End: 2024-02-28
Payer: COMMERCIAL

## 2024-02-28 ENCOUNTER — HOSPITAL ENCOUNTER (OUTPATIENT)
Age: 69
Setting detail: OUTPATIENT SURGERY
Discharge: HOME | End: 2024-02-28
Attending: SURGERY | Admitting: SURGERY
Payer: COMMERCIAL

## 2024-02-28 VITALS
OXYGEN SATURATION: 99 % | BODY MASS INDEX: 21.48 KG/M2 | WEIGHT: 145 LBS | DIASTOLIC BLOOD PRESSURE: 69 MMHG | TEMPERATURE: 97.2 F | RESPIRATION RATE: 15 BRPM | SYSTOLIC BLOOD PRESSURE: 129 MMHG | HEIGHT: 69 IN | HEART RATE: 77 BPM

## 2024-02-28 DIAGNOSIS — G89.18 POST-OP PAIN: Primary | ICD-10-CM

## 2024-02-28 PROBLEM — Z99.2 ENCOUNTER REGARDING VASCULAR ACCESS FOR DIALYSIS FOR ESRD (HCC): Status: ACTIVE | Noted: 2024-02-20

## 2024-02-28 LAB
ANION GAP SERPL CALCULATED.3IONS-SCNC: 15 MMOL/L (ref 3–16)
BUN SERPL-MCNC: 44 MG/DL (ref 7–20)
CALCIUM SERPL-MCNC: 9.1 MG/DL (ref 8.3–10.6)
CHLORIDE SERPL-SCNC: 98 MMOL/L (ref 99–110)
CO2 SERPL-SCNC: 22 MMOL/L (ref 21–32)
CREAT SERPL-MCNC: 5.7 MG/DL (ref 0.8–1.3)
DEPRECATED RDW RBC AUTO: 14.8 % (ref 12.4–15.4)
GFR SERPLBLD CREATININE-BSD FMLA CKD-EPI: 10 ML/MIN/{1.73_M2}
GLUCOSE SERPL-MCNC: 104 MG/DL (ref 70–99)
HCT VFR BLD AUTO: 37.8 % (ref 40.5–52.5)
HGB BLD-MCNC: 12.8 G/DL (ref 13.5–17.5)
MCH RBC QN AUTO: 32.9 PG (ref 26–34)
MCHC RBC AUTO-ENTMCNC: 33.9 G/DL (ref 31–36)
MCV RBC AUTO: 96.8 FL (ref 80–100)
PLATELET # BLD AUTO: 322 K/UL (ref 135–450)
PMV BLD AUTO: 7.2 FL (ref 5–10.5)
POTASSIUM SERPL-SCNC: 4.1 MMOL/L (ref 3.5–5.1)
RBC # BLD AUTO: 3.91 M/UL (ref 4.2–5.9)
SODIUM SERPL-SCNC: 135 MMOL/L (ref 136–145)
WBC # BLD AUTO: 6.4 K/UL (ref 4–11)

## 2024-02-28 PROCEDURE — 7100000001 HC PACU RECOVERY - ADDTL 15 MIN: Performed by: SURGERY

## 2024-02-28 PROCEDURE — 36821 AV FUSION DIRECT ANY SITE: CPT | Performed by: SURGERY

## 2024-02-28 PROCEDURE — 7100000000 HC PACU RECOVERY - FIRST 15 MIN: Performed by: SURGERY

## 2024-02-28 PROCEDURE — 6360000002 HC RX W HCPCS: Performed by: SURGERY

## 2024-02-28 PROCEDURE — 85027 COMPLETE CBC AUTOMATED: CPT

## 2024-02-28 PROCEDURE — 6370000000 HC RX 637 (ALT 250 FOR IP): Performed by: ANESTHESIOLOGY

## 2024-02-28 PROCEDURE — 3700000001 HC ADD 15 MINUTES (ANESTHESIA): Performed by: SURGERY

## 2024-02-28 PROCEDURE — 6360000002 HC RX W HCPCS: Performed by: NURSE ANESTHETIST, CERTIFIED REGISTERED

## 2024-02-28 PROCEDURE — A4217 STERILE WATER/SALINE, 500 ML: HCPCS | Performed by: SURGERY

## 2024-02-28 PROCEDURE — 7100000011 HC PHASE II RECOVERY - ADDTL 15 MIN: Performed by: SURGERY

## 2024-02-28 PROCEDURE — 2500000003 HC RX 250 WO HCPCS: Performed by: NURSE ANESTHETIST, CERTIFIED REGISTERED

## 2024-02-28 PROCEDURE — 80048 BASIC METABOLIC PNL TOTAL CA: CPT

## 2024-02-28 PROCEDURE — 6360000002 HC RX W HCPCS: Performed by: ANESTHESIOLOGY

## 2024-02-28 PROCEDURE — 3700000000 HC ANESTHESIA ATTENDED CARE: Performed by: SURGERY

## 2024-02-28 PROCEDURE — 7100000010 HC PHASE II RECOVERY - FIRST 15 MIN: Performed by: SURGERY

## 2024-02-28 PROCEDURE — 3600000007 HC SURGERY HYBRID BASE: Performed by: SURGERY

## 2024-02-28 PROCEDURE — 2709999900 HC NON-CHARGEABLE SUPPLY: Performed by: SURGERY

## 2024-02-28 PROCEDURE — 3600000017 HC SURGERY HYBRID ADDL 15MIN: Performed by: SURGERY

## 2024-02-28 PROCEDURE — 36415 COLL VENOUS BLD VENIPUNCTURE: CPT

## 2024-02-28 PROCEDURE — 2580000003 HC RX 258: Performed by: SURGERY

## 2024-02-28 PROCEDURE — 2580000003 HC RX 258: Performed by: NURSE ANESTHETIST, CERTIFIED REGISTERED

## 2024-02-28 PROCEDURE — 6360000002 HC RX W HCPCS

## 2024-02-28 RX ORDER — HEPARIN SODIUM 1000 [USP'U]/ML
INJECTION, SOLUTION INTRAVENOUS; SUBCUTANEOUS PRN
Status: DISCONTINUED | OUTPATIENT
Start: 2024-02-28 | End: 2024-02-28 | Stop reason: SDUPTHER

## 2024-02-28 RX ORDER — CEFAZOLIN SODIUM IN 0.9 % NACL 2 G/100 ML
2000 PLASTIC BAG, INJECTION (ML) INTRAVENOUS
Status: COMPLETED | OUTPATIENT
Start: 2024-02-28 | End: 2024-02-28

## 2024-02-28 RX ORDER — SODIUM CHLORIDE 0.9 % (FLUSH) 0.9 %
5-40 SYRINGE (ML) INJECTION PRN
Status: DISCONTINUED | OUTPATIENT
Start: 2024-02-28 | End: 2024-02-28 | Stop reason: HOSPADM

## 2024-02-28 RX ORDER — OXYCODONE HYDROCHLORIDE 5 MG/1
5 TABLET ORAL PRN
Status: DISCONTINUED | OUTPATIENT
Start: 2024-02-28 | End: 2024-02-28 | Stop reason: HOSPADM

## 2024-02-28 RX ORDER — SODIUM CHLORIDE 9 MG/ML
INJECTION, SOLUTION INTRAVENOUS PRN
Status: DISCONTINUED | OUTPATIENT
Start: 2024-02-28 | End: 2024-02-28 | Stop reason: HOSPADM

## 2024-02-28 RX ORDER — LIDOCAINE HYDROCHLORIDE 10 MG/ML
0.3 INJECTION, SOLUTION EPIDURAL; INFILTRATION; INTRACAUDAL; PERINEURAL
Status: DISCONTINUED | OUTPATIENT
Start: 2024-02-28 | End: 2024-02-28 | Stop reason: HOSPADM

## 2024-02-28 RX ORDER — LIDOCAINE HYDROCHLORIDE 20 MG/ML
INJECTION, SOLUTION INFILTRATION; PERINEURAL PRN
Status: DISCONTINUED | OUTPATIENT
Start: 2024-02-28 | End: 2024-02-28 | Stop reason: SDUPTHER

## 2024-02-28 RX ORDER — OXYCODONE HYDROCHLORIDE 5 MG/1
10 TABLET ORAL PRN
Status: DISCONTINUED | OUTPATIENT
Start: 2024-02-28 | End: 2024-02-28 | Stop reason: HOSPADM

## 2024-02-28 RX ORDER — PROPOFOL 10 MG/ML
INJECTION, EMULSION INTRAVENOUS PRN
Status: DISCONTINUED | OUTPATIENT
Start: 2024-02-28 | End: 2024-02-28 | Stop reason: SDUPTHER

## 2024-02-28 RX ORDER — DEXAMETHASONE SODIUM PHOSPHATE 4 MG/ML
INJECTION, SOLUTION INTRA-ARTICULAR; INTRALESIONAL; INTRAMUSCULAR; INTRAVENOUS; SOFT TISSUE PRN
Status: DISCONTINUED | OUTPATIENT
Start: 2024-02-28 | End: 2024-02-28 | Stop reason: SDUPTHER

## 2024-02-28 RX ORDER — SODIUM CHLORIDE 0.9 % (FLUSH) 0.9 %
5-40 SYRINGE (ML) INJECTION EVERY 12 HOURS SCHEDULED
Status: DISCONTINUED | OUTPATIENT
Start: 2024-02-28 | End: 2024-02-28 | Stop reason: HOSPADM

## 2024-02-28 RX ORDER — SODIUM CHLORIDE, SODIUM LACTATE, POTASSIUM CHLORIDE, CALCIUM CHLORIDE 600; 310; 30; 20 MG/100ML; MG/100ML; MG/100ML; MG/100ML
INJECTION, SOLUTION INTRAVENOUS CONTINUOUS
Status: DISCONTINUED | OUTPATIENT
Start: 2024-02-28 | End: 2024-02-28 | Stop reason: HOSPADM

## 2024-02-28 RX ORDER — ONDANSETRON 2 MG/ML
4 INJECTION INTRAMUSCULAR; INTRAVENOUS EVERY 30 MIN PRN
Status: DISCONTINUED | OUTPATIENT
Start: 2024-02-28 | End: 2024-02-28 | Stop reason: HOSPADM

## 2024-02-28 RX ORDER — PHENYLEPHRINE HCL IN 0.9% NACL 1 MG/10 ML
SYRINGE (ML) INTRAVENOUS PRN
Status: DISCONTINUED | OUTPATIENT
Start: 2024-02-28 | End: 2024-02-28 | Stop reason: SDUPTHER

## 2024-02-28 RX ORDER — ONDANSETRON 2 MG/ML
INJECTION INTRAMUSCULAR; INTRAVENOUS PRN
Status: DISCONTINUED | OUTPATIENT
Start: 2024-02-28 | End: 2024-02-28 | Stop reason: SDUPTHER

## 2024-02-28 RX ORDER — OXYCODONE HYDROCHLORIDE 5 MG/1
5 TABLET ORAL EVERY 6 HOURS PRN
Qty: 12 TABLET | Refills: 0 | Status: SHIPPED | OUTPATIENT
Start: 2024-02-28 | End: 2024-03-02

## 2024-02-28 RX ORDER — SODIUM CHLORIDE 9 MG/ML
INJECTION, SOLUTION INTRAVENOUS CONTINUOUS PRN
Status: DISCONTINUED | OUTPATIENT
Start: 2024-02-28 | End: 2024-02-28 | Stop reason: SDUPTHER

## 2024-02-28 RX ADMIN — ONDANSETRON 4 MG: 2 INJECTION INTRAMUSCULAR; INTRAVENOUS at 09:29

## 2024-02-28 RX ADMIN — Medication 2000 MG: at 09:27

## 2024-02-28 RX ADMIN — Medication 100 MCG: at 09:54

## 2024-02-28 RX ADMIN — Medication 100 MCG: at 09:35

## 2024-02-28 RX ADMIN — SODIUM CHLORIDE: 9 INJECTION, SOLUTION INTRAVENOUS at 09:15

## 2024-02-28 RX ADMIN — HEPARIN SODIUM 4000 UNITS: 1000 INJECTION, SOLUTION INTRAVENOUS; SUBCUTANEOUS at 09:45

## 2024-02-28 RX ADMIN — OXYCODONE 5 MG: 5 TABLET ORAL at 11:28

## 2024-02-28 RX ADMIN — Medication 100 MCG: at 10:04

## 2024-02-28 RX ADMIN — HYDROMORPHONE HYDROCHLORIDE 0.5 MG: 1 INJECTION, SOLUTION INTRAMUSCULAR; INTRAVENOUS; SUBCUTANEOUS at 10:49

## 2024-02-28 RX ADMIN — PROPOFOL 130 MG: 10 INJECTION, EMULSION INTRAVENOUS at 09:21

## 2024-02-28 RX ADMIN — HYDROMORPHONE HYDROCHLORIDE 0.5 MG: 1 INJECTION, SOLUTION INTRAMUSCULAR; INTRAVENOUS; SUBCUTANEOUS at 10:55

## 2024-02-28 RX ADMIN — Medication 200 MCG: at 10:03

## 2024-02-28 RX ADMIN — HYDROMORPHONE HYDROCHLORIDE 0.5 MG: 1 INJECTION, SOLUTION INTRAMUSCULAR; INTRAVENOUS; SUBCUTANEOUS at 11:05

## 2024-02-28 RX ADMIN — DEXAMETHASONE SODIUM PHOSPHATE 4 MG: 4 INJECTION, SOLUTION INTRAMUSCULAR; INTRAVENOUS at 09:29

## 2024-02-28 RX ADMIN — LIDOCAINE HYDROCHLORIDE 60 MG: 20 INJECTION, SOLUTION INFILTRATION; PERINEURAL at 09:21

## 2024-02-28 ASSESSMENT — PAIN DESCRIPTION - LOCATION
LOCATION: ARM

## 2024-02-28 ASSESSMENT — PAIN SCALES - GENERAL
PAINLEVEL_OUTOF10: 9
PAINLEVEL_OUTOF10: 7
PAINLEVEL_OUTOF10: 5
PAINLEVEL_OUTOF10: 8

## 2024-02-28 ASSESSMENT — PAIN - FUNCTIONAL ASSESSMENT
PAIN_FUNCTIONAL_ASSESSMENT: NONE - DENIES PAIN
PAIN_FUNCTIONAL_ASSESSMENT: ACTIVITIES ARE NOT PREVENTED
PAIN_FUNCTIONAL_ASSESSMENT: ACTIVITIES ARE NOT PREVENTED
PAIN_FUNCTIONAL_ASSESSMENT: 0-10

## 2024-02-28 ASSESSMENT — PAIN DESCRIPTION - ORIENTATION
ORIENTATION: LEFT
ORIENTATION: LEFT

## 2024-02-28 ASSESSMENT — PAIN DESCRIPTION - DESCRIPTORS
DESCRIPTORS: ACHING
DESCRIPTORS: ACHING

## 2024-02-28 NOTE — H&P
I have reviewed the history and physical (See note dated 2/16/2024) and examined the patient and find no relevant changes.   I have reviewed with the patient and/or family the risks, benefits, and alternatives to the procedure.

## 2024-02-28 NOTE — PROGRESS NOTES
Patient admitted to [facility]. Consents verified. Patient NPO since 2000. Patient belongings to remain on PACU cart for procedure.

## 2024-02-28 NOTE — DISCHARGE INSTRUCTIONS
ANESTHESIA DISCHARGE INSTRUCTIONS    You are under the influence of drugs- do not drink alcohol, drive a car, operate machinery(such as power tools, kitchen appliances, etc), sign legal documents, or make any important decisions for 24 hours (or while on pain medications).   Children should not ride bikes or skate boards or play on gym sets  for 24 hours after surgery.  A responsible adult should be with you for 24 hours.  Rest at home today- increase activity as tolerated.  Progress slowly to a regular diet unless your physician has instructed you otherwise. Drink plenty of water.    CALL YOUR DOCTOR IF YOU:  Have moderate to severe nausea or vomiting AND are unable to hold down fluids or prescribed medications.  Have bright red bloody drainage from your dressing that won't stop oozing.  Do not get relief with your pain medication    NORMAL (POSSIBLE) SIDE EFFECTS FROM ANESTHESIA:     Confusion, temporary memory loss, delayed reaction times in the first 24 hours  Lightheadedness, dizziness, difficulty focusing, blurred vision  Nausea/vomiting can happen  Shivering, feeling cold, sore throat, cough and muscle aches should stop within 24-48 hours  Trouble urinating - call your surgeon if it has been more than 8 hrs  Bruising or soreness at the IV site - call if it remains red, firm or there is drainage             FEMALES OF CHILDBEARING AGE WHO ARE TAKING BIRTH CONTROL PILLS:  You may have received a medication during your procedure that interferes with the   actions of birth control pills (Bridion or Emend). Use some other kind of birth control in addition to your pills, like a condom, for 1 month after your procedure to prevent unwanted pregnancy.    The following instructions are to be followed if you have a known history or diagnosis of sleep apnea:  For all sleep apnea patients:  ? Sleep on your side or sitting up in a chair whenever possible, especially the first 24 hours after surgery.  ? Use only medicines

## 2024-02-28 NOTE — BRIEF OP NOTE
Brief Postoperative Note      Patient: Milan Peres  YOB: 1955  MRN: [id number]    Date of Procedure: 2/28/2024    Pre-Op Diagnosis Codes:     * End stage renal disease (HCC) [N18.6]    Post-Op Diagnosis: Same       Procedure(s):  LEFT RADIAL CEPHALIC ARTERIAL VENOUS  FISTULA CREATION    Surgeon(s):  Simon Hauser MD    Assistant:  Surgical Assistant: Gerard Johns    Anesthesia: General    Estimated Blood Loss (mL): Minimal    Complications: None    Specimens:   * No specimens in log *    Implants:  * No implants in log *      Drains:   [REMOVED] Urinary Catheter 02/24/24 Moss (Removed)   $ Urethral catheter insertion $ Not inserted for procedure 02/24/24 1012             Electronically signed by Simon Hauser MD on 2/28/2024 at 10:12 AM

## 2024-02-28 NOTE — ANESTHESIA PRE PROCEDURE
last liquid consumption: 02/27/24                        Date of last solid food consumption: 02/27/24    BMI:   Wt Readings from Last 3 Encounters:   02/20/24 65.8 kg (145 lb)   02/24/24 76.8 kg (169 lb 6.4 oz)   02/21/24 75.8 kg (167 lb)     Body mass index is 21.41 kg/m².    CBC:   Lab Results   Component Value Date/Time    WBC 9.0 02/24/2024 06:46 AM    RBC 3.85 02/24/2024 06:46 AM    HGB 12.8 02/24/2024 06:46 AM    HCT 38.0 02/24/2024 06:46 AM    MCV 98.6 02/24/2024 06:46 AM    RDW 14.6 02/24/2024 06:46 AM     02/24/2024 06:46 AM       CMP:   Lab Results   Component Value Date/Time     02/24/2024 06:46 AM    K 4.1 02/24/2024 06:46 AM    CL 97 02/24/2024 06:46 AM    CO2 25 02/24/2024 06:46 AM    BUN 37 02/24/2024 06:46 AM    CREATININE 6.4 02/24/2024 06:46 AM    AGRATIO 1.4 02/24/2024 06:46 AM    LABGLOM 9 02/24/2024 06:46 AM    GLUCOSE 109 02/24/2024 06:46 AM    PROT 7.5 02/24/2024 06:46 AM    CALCIUM 9.3 02/24/2024 06:46 AM    BILITOT 0.3 02/24/2024 06:46 AM    ALKPHOS 80 02/24/2024 06:46 AM    AST 8 02/24/2024 06:46 AM    ALT 9 02/24/2024 06:46 AM       POC Tests: No results for input(s): \"POCGLU\", \"POCNA\", \"POCK\", \"POCCL\", \"POCBUN\", \"POCHEMO\", \"POCHCT\" in the last 72 hours.    Coags: No results found for: \"PROTIME\", \"INR\", \"APTT\"    HCG (If Applicable): No results found for: \"PREGTESTUR\", \"PREGSERUM\", \"HCG\", \"HCGQUANT\"     ABGs: No results found for: \"PHART\", \"PO2ART\", \"WLM6EQL\", \"QXA6CLL\", \"BEART\", \"A0KNPMUZ\"     Type & Screen (If Applicable):  No results found for: \"LABABO\", \"LABRH\"    Drug/Infectious Status (If Applicable):  No results found for: \"HIV\", \"HEPCAB\"    COVID-19 Screening (If Applicable): No results found for: \"COVID19\"        Anesthesia Evaluation    Airway: Mallampati: II          Dental:    (+) poor dentition      Pulmonary: breath sounds clear to auscultation                             Cardiovascular:            Rhythm: regular  Rate: normal                    Neuro/Psych:

## 2024-02-28 NOTE — PROGRESS NOTES
Patient arrived to PACU bay 5, phase one initiated. Placed on bedside monitor, VSS. Report obtained from OR RN and anesthesia. Patient on O2 via NC at 3L. Assessment WNL. Warm blankets applied. Side rails in place, will monitor patient closely. Fistula with positive bruit and thrill.  Will continue to monitor.

## 2024-02-28 NOTE — ANESTHESIA POSTPROCEDURE EVALUATION
Department of Anesthesiology  Postprocedure Note    Patient: Milan Peres  MRN: [id number]  YOB: 1955  Date of evaluation: 2/28/2024    Procedure Summary       Date: 02/28/24 Room / Location: [place] [address] ([facility]) / [facility]    Anesthesia Start: 0916 Anesthesia Stop: 1026    Procedure: LEFT RADIAL CEPHALIC ARTERIAL VENOUS  FISTULA CREATION (Left: Arm Lower) Diagnosis:       End stage renal disease (HCC)      (End stage renal disease (HCC) [N18.6])    Surgeons: Simon Hauser MD Responsible Provider: Tai Wu MD    Anesthesia Type: general ASA Status: 3            Anesthesia Type: No value filed.    Karen Phase I: Karen Score: 10    Karen Phase II: Karen Score: 10    Anesthesia Post Evaluation    Patient location during evaluation: PACU  Level of consciousness: awake  Airway patency: patent  Nausea & Vomiting: no vomiting  Cardiovascular status: blood pressure returned to baseline  Respiratory status: acceptable  Hydration status: stable  Pain management: adequate    No notable events documented.

## 2024-02-29 NOTE — OP NOTE
suture to approximate the skin edges.  The incision was dressed with Dermabond.  There were no complications of this procedure.  Estimated blood loss was minimal.  At the end of the procedure, sponge, needle, and instrument counts were correct.  The patient was extubated and transferred to the recovery room in stable condition.          DARLENE ALONSO MD      D:  02/28/2024 18:52:12     T:  02/29/2024 01:03:24     PAMLEA/RIDDHI  Job #:  [id number]     Doc#:  [id number]

## 2024-03-11 ENCOUNTER — TELEPHONE (OUTPATIENT)
Dept: FAMILY MEDICINE CLINIC | Age: 69
End: 2024-03-11

## 2024-03-11 NOTE — TELEPHONE ENCOUNTER
Pt's daughter in law called to let dr enrique know that pt's UTI has returned. Pt was seen at the hospital for this. Pt scheduled Tuesday 11:20

## 2024-03-12 ENCOUNTER — OFFICE VISIT (OUTPATIENT)
Dept: FAMILY MEDICINE CLINIC | Age: 69
End: 2024-03-12
Payer: COMMERCIAL

## 2024-03-12 VITALS
WEIGHT: 165 LBS | BODY MASS INDEX: 24.44 KG/M2 | SYSTOLIC BLOOD PRESSURE: 128 MMHG | HEART RATE: 114 BPM | DIASTOLIC BLOOD PRESSURE: 80 MMHG | HEIGHT: 69 IN | OXYGEN SATURATION: 98 %

## 2024-03-12 DIAGNOSIS — N30.90 CYSTITIS: ICD-10-CM

## 2024-03-12 DIAGNOSIS — R35.0 FREQUENCY OF URINATION: Primary | ICD-10-CM

## 2024-03-12 LAB
BILIRUBIN, POC: NORMAL
BLOOD URINE, POC: NORMAL
CLARITY, POC: NORMAL
COLOR, POC: YELLOW
GLUCOSE URINE, POC: NORMAL
KETONES, POC: NORMAL
LEUKOCYTE EST, POC: NORMAL
NITRITE, POC: NORMAL
PH, POC: 7
PROTEIN, POC: >=300
SPECIFIC GRAVITY, POC: 1.02
UROBILINOGEN, POC: 0.2

## 2024-03-12 PROCEDURE — 81002 URINALYSIS NONAUTO W/O SCOPE: CPT | Performed by: STUDENT IN AN ORGANIZED HEALTH CARE EDUCATION/TRAINING PROGRAM

## 2024-03-12 PROCEDURE — 99213 OFFICE O/P EST LOW 20 MIN: CPT | Performed by: STUDENT IN AN ORGANIZED HEALTH CARE EDUCATION/TRAINING PROGRAM

## 2024-03-12 PROCEDURE — 1123F ACP DISCUSS/DSCN MKR DOCD: CPT | Performed by: STUDENT IN AN ORGANIZED HEALTH CARE EDUCATION/TRAINING PROGRAM

## 2024-03-12 RX ORDER — LEVOFLOXACIN 250 MG/1
250 TABLET, FILM COATED ORAL EVERY OTHER DAY
Qty: 3 TABLET | Refills: 0 | Status: SHIPPED | OUTPATIENT
Start: 2024-03-12 | End: 2024-03-18

## 2024-03-12 ASSESSMENT — ENCOUNTER SYMPTOMS
COUGH: 0
SHORTNESS OF BREATH: 0
DIARRHEA: 0
CONSTIPATION: 0

## 2024-03-12 NOTE — PROGRESS NOTES
fever.   HENT:  Negative for congestion.    Respiratory:  Negative for cough and shortness of breath.    Cardiovascular:  Negative for leg swelling.   Gastrointestinal:  Negative for constipation and diarrhea.   Genitourinary:  Positive for dysuria.   Neurological:  Negative for headaches.   Psychiatric/Behavioral:  Negative for sleep disturbance. The patient is not nervous/anxious.        Objective:    Physical Exam:   Vitals:    03/12/24 1108   BP: 128/80   Site: Right Upper Arm   Position: Sitting   Pulse: (!) 114   SpO2: 98%   Weight: 74.8 kg (165 lb)   Height: 1.753 m (5' 9\")       Physical Exam  HENT:      Head: Normocephalic.   Eyes:      Pupils: Pupils are equal, round, and reactive to light.   Cardiovascular:      Rate and Rhythm: Normal rate.   Pulmonary:      Effort: Pulmonary effort is normal.   Musculoskeletal:      Cervical back: Normal range of motion.   Skin:     General: Skin is warm and dry.   Neurological:      General: No focal deficit present.      Mental Status: He is alert.        I attest that on 03/12/24 , I spent a total of 25 minutes, in addition to face-to-face time during the visit, reviewing the patient's records and labs before the visit, reviewing the patient's records and labs with the patient/guardian in the visit, counseling the patient/guardian on the above noted plan, ordering medications, and documenting the encounter after the visit.      Sincerely,  Abilio Morgan Jr., MD MPH  Family & Preventive Medicine    This note was generated completely or in part utilizing Dragon dictation speech recognition software.  Occasionally, words are mistranscribed and despite editing, the text may contain inaccuracies due to incorrect word recognition.  If further clarification is needed please contact the office

## 2024-03-14 LAB
BACTERIA UR CULT: ABNORMAL
ORGANISM: ABNORMAL

## 2024-03-15 ENCOUNTER — TELEPHONE (OUTPATIENT)
Dept: FAMILY MEDICINE CLINIC | Age: 69
End: 2024-03-15

## 2024-03-15 RX ORDER — CIPROFLOXACIN 500 MG/1
500 TABLET, FILM COATED ORAL DAILY
Qty: 3 TABLET | Refills: 0 | Status: SHIPPED | OUTPATIENT
Start: 2024-03-15 | End: 2024-03-18

## 2024-03-15 NOTE — TELEPHONE ENCOUNTER
Please advise patient that urine culture results show some resistance to Levaquin antibiotic which she is currently taking.  He is to discontinue Levaquin and start ciprofloxacin which I have sent to his pharmacy.  Ciprofloxacin should be taken daily for 3 days.  On dialysis days he should take ciprofloxacin after dialysis.    He is also to make a follow-up appointment with urology as discussed at last visit for recurrent UTI management.      If patient has any additional questions or concerns please schedule telephone visit to discuss further.  Thanks!

## 2024-03-22 ENCOUNTER — PROCEDURE VISIT (OUTPATIENT)
Dept: FAMILY MEDICINE CLINIC | Age: 69
End: 2024-03-22

## 2024-03-22 VITALS
BODY MASS INDEX: 25.18 KG/M2 | OXYGEN SATURATION: 98 % | HEIGHT: 69 IN | SYSTOLIC BLOOD PRESSURE: 126 MMHG | DIASTOLIC BLOOD PRESSURE: 76 MMHG | WEIGHT: 170 LBS | HEART RATE: 110 BPM

## 2024-03-22 DIAGNOSIS — N30.90 CYSTITIS: ICD-10-CM

## 2024-03-22 DIAGNOSIS — M70.71 BURSITIS OF RIGHT HIP, UNSPECIFIED BURSA: Primary | ICD-10-CM

## 2024-03-22 RX ORDER — METHYLPREDNISOLONE ACETATE 40 MG/ML
40 INJECTION, SUSPENSION INTRA-ARTICULAR; INTRALESIONAL; INTRAMUSCULAR; SOFT TISSUE ONCE
Status: COMPLETED | OUTPATIENT
Start: 2024-03-22 | End: 2024-03-22

## 2024-03-22 RX ADMIN — METHYLPREDNISOLONE ACETATE 40 MG: 40 INJECTION, SUSPENSION INTRA-ARTICULAR; INTRALESIONAL; INTRAMUSCULAR; SOFT TISSUE at 08:47

## 2024-03-22 ASSESSMENT — ENCOUNTER SYMPTOMS
COUGH: 0
SHORTNESS OF BREATH: 0
CONSTIPATION: 0
DIARRHEA: 0

## 2024-03-22 NOTE — PROGRESS NOTES
cystoscopy. Currently without any abdominal pain. Stil has han catheter however has upcoming appointment to discuss next steps following biopsy.       Bilateral (L>R) hip pain:  Patient reports symptoms of hip pain with with walking or pushing a cart.  Localizes pain to latearl hip area area.  Pain began approximately > 20 years ago. Never seen by health care provider for this. Characterizes pain as burning. Rates pain at 0/10 at rest and 4/10 with activity and may progress to 6-8/10 if \"pushes it.  Denies any symptoms of groin pain or significant back pain.  Patient reports has attempted behavior modification treatments.  Aggravating factors include walking activity and driving \"stick shift\" . No known trauma to area.     Occupational history: Tile work (retired 2023)     Since last visit history unchanged and presents today for corticosteroid injection.    Patient's past medical history and medications were all reviewed and updated as appropriate today.    Review of Systems:  Review of Systems   Constitutional:  Negative for fatigue and fever.   HENT:  Negative for congestion.    Respiratory:  Negative for cough and shortness of breath.    Cardiovascular:  Negative for leg swelling.   Gastrointestinal:  Negative for constipation and diarrhea.   Genitourinary:  Negative for dysuria.   Neurological:  Negative for headaches.   Psychiatric/Behavioral:  Negative for sleep disturbance. The patient is not nervous/anxious.        Objective:    Physical Exam:   Vitals:    03/22/24 0823   BP: 126/76   Pulse: (!) 110   SpO2: 98%   Weight: 77.1 kg (170 lb)   Height: 1.753 m (5' 9\")       Physical Exam  HENT:      Head: Normocephalic.   Eyes:      Pupils: Pupils are equal, round, and reactive to light.   Cardiovascular:      Rate and Rhythm: Normal rate and regular rhythm.   Pulmonary:      Effort: Pulmonary effort is normal.      Breath sounds: Normal breath sounds.   Musculoskeletal:      Cervical back: Normal range of

## 2024-04-03 ENCOUNTER — TELEPHONE (OUTPATIENT)
Dept: VASCULAR SURGERY | Age: 69
End: 2024-04-03

## 2024-04-03 NOTE — TELEPHONE ENCOUNTER
Called patient to reschedule apt as no showed on March 27, 2024. Daughter answered and was driving so said would have to call me back. Need to get the patient back in on 4/10/24 to check fistula. Pamjaylon

## 2024-04-19 ENCOUNTER — OFFICE VISIT (OUTPATIENT)
Dept: VASCULAR SURGERY | Age: 69
End: 2024-04-19

## 2024-04-19 VITALS
HEIGHT: 69 IN | BODY MASS INDEX: 25.18 KG/M2 | SYSTOLIC BLOOD PRESSURE: 120 MMHG | DIASTOLIC BLOOD PRESSURE: 72 MMHG | WEIGHT: 170 LBS

## 2024-04-19 DIAGNOSIS — Z09 POSTOPERATIVE EXAMINATION: Primary | ICD-10-CM

## 2024-04-19 PROCEDURE — 99024 POSTOP FOLLOW-UP VISIT: CPT | Performed by: SURGERY

## 2024-04-19 NOTE — PROGRESS NOTES
Postop Progress Note    Subjective    Milan Peres presents to the office for postop follow up.    Objective    Vitals:    04/19/24 1540   BP: 120/72     General: alert, cooperative and no distress  Incision: well healed  Palpable thrill and good bruit.      Assessment  Doing well postoperatively.    Plan  Access scan to see if good volume flow- if so can begin cannulation.     Electronically signed by Simon Hauser MD on 4/19/2024 at 4:02 PM

## 2024-05-02 ENCOUNTER — TELEPHONE (OUTPATIENT)
Dept: VASCULAR SURGERY | Age: 69
End: 2024-05-02

## 2024-05-02 DIAGNOSIS — N18.6 END STAGE RENAL DISEASE (HCC): Primary | ICD-10-CM

## 2024-05-02 NOTE — TELEPHONE ENCOUNTER
Called patient with date and time of hemodialysis access scan at [facility] for Tuesday May 7,2024 at 1:00pm and arrive at 12:30pm.[email or web address]

## 2024-05-07 ENCOUNTER — HOSPITAL ENCOUNTER (OUTPATIENT)
Dept: VASCULAR LAB | Age: 69
Discharge: HOME | End: 2024-05-09
Attending: SURGERY
Payer: COMMERCIAL

## 2024-05-07 DIAGNOSIS — N18.6 END STAGE RENAL DISEASE (HCC): ICD-10-CM

## 2024-05-07 LAB
VAS LEFT ARTERIAL PROX ANASTOMOSIS AVF EDV: 153 CM/S
VAS LEFT ARTERIAL PROX ANASTOMOSIS AVF PSV: 279 CM/S
VAS LEFT AVF AVG DIAMETER 1: 0.39 CM
VAS LEFT AVF AVG DIAMETER 2: 0.11 CM
VAS LEFT AVF AVG DIAMETER 3: 0.21 CM
VAS LEFT AVF AVG INFLOW VOL FLOW: 287 ML/MIN
VAS LEFT AVF AVG OUTFLOW VESSEL NAME: NORMAL
VAS LEFT AVF AVG PROX ANAST VOL FLOW: 1088 ML/MIN
VAS LEFT AVG AVF DEPTH 1: 0.31 CM
VAS LEFT AVG AVF DEPTH 2: 0.21 CM
VAS LEFT AVG AVF DEPTH 3: 0.36 CM
VAS LEFT AX A MID PSV: 130 CM/S
VAS LEFT BRACHIAL A DIST PSV: 113 CM/S
VAS LEFT BRACHIAL A PROX PSV: 143 CM/S
VAS LEFT INFLOW ARTERY AVF EDV: 80.6 CM/S
VAS LEFT INFLOW ARTERY AVF PSV: 167 CM/S
VAS LEFT RADIAL A DIST PSV: 167 CM/S
VAS LEFT RADIAL A PROX PSV: 202 CM/S
VAS LEFT SUBCLAVIAN PROX PSV: 236 CM/S

## 2024-05-07 PROCEDURE — 93990 DOPPLER FLOW TESTING: CPT

## 2024-05-07 PROCEDURE — 93990 DOPPLER FLOW TESTING: CPT | Performed by: SURGERY

## 2024-05-10 ENCOUNTER — TELEPHONE (OUTPATIENT)
Dept: VASCULAR SURGERY | Age: 69
End: 2024-05-10

## 2024-05-20 NOTE — PROGRESS NOTES
RATE  ___________  (__) OAC  _________________________  (__) C-REACTIVE PROTEIN ___________    (__) VITAMIN D HYDROXY ___________  (__) BETABLOCKER  _________________                                                                                       (__) ACE/ARBS:__________________________    (__) GLP-1 Agonist ___________________                Ride home/Contact #_______________________   (__) SCLT2 inhibitor ___________________

## 2024-06-05 ENCOUNTER — OFFICE VISIT (OUTPATIENT)
Dept: FAMILY MEDICINE CLINIC | Age: 69
End: 2024-06-05
Payer: COMMERCIAL

## 2024-06-05 VITALS
HEART RATE: 96 BPM | BODY MASS INDEX: 25.39 KG/M2 | OXYGEN SATURATION: 98 % | DIASTOLIC BLOOD PRESSURE: 88 MMHG | SYSTOLIC BLOOD PRESSURE: 138 MMHG | TEMPERATURE: 97.4 F | HEIGHT: 69 IN | WEIGHT: 171.4 LBS

## 2024-06-05 DIAGNOSIS — Z01.818 PRE-OP EXAM: ICD-10-CM

## 2024-06-05 DIAGNOSIS — Z00.00 ANNUAL PHYSICAL EXAM: ICD-10-CM

## 2024-06-05 DIAGNOSIS — R33.9 URINARY RETENTION WITH INCOMPLETE BLADDER EMPTYING: ICD-10-CM

## 2024-06-05 DIAGNOSIS — Z01.818 PRE-OP EXAM: Primary | ICD-10-CM

## 2024-06-05 LAB
ALBUMIN SERPL-MCNC: 4.5 G/DL (ref 3.4–5)
ALBUMIN/GLOB SERPL: 1.8 {RATIO} (ref 1.1–2.2)
ALP SERPL-CCNC: 69 U/L (ref 40–129)
ALT SERPL-CCNC: 9 U/L (ref 10–40)
ANION GAP SERPL CALCULATED.3IONS-SCNC: 16 MMOL/L (ref 3–16)
APTT BLD: 29 SEC (ref 22.1–36.4)
AST SERPL-CCNC: 7 U/L (ref 15–37)
BACTERIA URNS QL MICRO: ABNORMAL /HPF
BILIRUB SERPL-MCNC: 0.3 MG/DL (ref 0–1)
BILIRUB UR QL STRIP.AUTO: NEGATIVE
BUN SERPL-MCNC: 35 MG/DL (ref 7–20)
CALCIUM SERPL-MCNC: 9.9 MG/DL (ref 8.3–10.6)
CHLORIDE SERPL-SCNC: 96 MMOL/L (ref 99–110)
CLARITY UR: ABNORMAL
CO2 SERPL-SCNC: 25 MMOL/L (ref 21–32)
COLOR UR: YELLOW
CREAT SERPL-MCNC: 6.7 MG/DL (ref 0.8–1.3)
DEPRECATED RDW RBC AUTO: 16 % (ref 12.4–15.4)
EPI CELLS #/AREA URNS AUTO: 1 /HPF (ref 0–5)
GFR SERPLBLD CREATININE-BSD FMLA CKD-EPI: 8 ML/MIN/{1.73_M2}
GLUCOSE SERPL-MCNC: 95 MG/DL (ref 70–99)
GLUCOSE UR STRIP.AUTO-MCNC: NEGATIVE MG/DL
HCT VFR BLD AUTO: 39.6 % (ref 40.5–52.5)
HGB BLD-MCNC: 13.5 G/DL (ref 13.5–17.5)
HGB UR QL STRIP.AUTO: ABNORMAL
HYALINE CASTS #/AREA URNS AUTO: 20 /LPF (ref 0–8)
INR PPP: 1.01 (ref 0.85–1.15)
KETONES UR STRIP.AUTO-MCNC: NEGATIVE MG/DL
LEUKOCYTE ESTERASE UR QL STRIP.AUTO: ABNORMAL
MCH RBC QN AUTO: 33.4 PG (ref 26–34)
MCHC RBC AUTO-ENTMCNC: 34 G/DL (ref 31–36)
MCV RBC AUTO: 98.1 FL (ref 80–100)
NITRITE UR QL STRIP.AUTO: NEGATIVE
PH UR STRIP.AUTO: 6.5 [PH] (ref 5–8)
PLATELET # BLD AUTO: 264 K/UL (ref 135–450)
PMV BLD AUTO: 7.8 FL (ref 5–10.5)
POTASSIUM SERPL-SCNC: 4.2 MMOL/L (ref 3.5–5.1)
PROT SERPL-MCNC: 7 G/DL (ref 6.4–8.2)
PROT UR STRIP.AUTO-MCNC: 300 MG/DL
PROTHROMBIN TIME: 13.5 SEC (ref 11.9–14.9)
RBC # BLD AUTO: 4.03 M/UL (ref 4.2–5.9)
RBC CLUMPS #/AREA URNS AUTO: 202 /HPF (ref 0–4)
SODIUM SERPL-SCNC: 137 MMOL/L (ref 136–145)
SP GR UR STRIP.AUTO: 1.01 (ref 1–1.03)
UA DIPSTICK W REFLEX MICRO PNL UR: YES
URN SPEC COLLECT METH UR: ABNORMAL
UROBILINOGEN UR STRIP-ACNC: 0.2 E.U./DL
WBC # BLD AUTO: 7.7 K/UL (ref 4–11)
WBC #/AREA URNS AUTO: 3004 /HPF (ref 0–5)

## 2024-06-05 PROCEDURE — 93000 ELECTROCARDIOGRAM COMPLETE: CPT | Performed by: STUDENT IN AN ORGANIZED HEALTH CARE EDUCATION/TRAINING PROGRAM

## 2024-06-05 PROCEDURE — 1123F ACP DISCUSS/DSCN MKR DOCD: CPT | Performed by: STUDENT IN AN ORGANIZED HEALTH CARE EDUCATION/TRAINING PROGRAM

## 2024-06-05 PROCEDURE — 99214 OFFICE O/P EST MOD 30 MIN: CPT | Performed by: STUDENT IN AN ORGANIZED HEALTH CARE EDUCATION/TRAINING PROGRAM

## 2024-06-05 ASSESSMENT — ENCOUNTER SYMPTOMS
CONSTIPATION: 0
COUGH: 0
SHORTNESS OF BREATH: 0
DIARRHEA: 0

## 2024-06-05 NOTE — PROGRESS NOTES
[facility]  [address],  [place] [address]  Pre-Operative Evaluation      DIAGNOSIS: Urinary retention    PROCEDURE:  CYSTOSCOPY, TRANSURETHRAL RESECTION OF THE  PROSTATE       History Obtained From:  patient    HISTORY OF PRESENT ILLNESS:    The patient is a 68 y.o. male with significant past medical history of COPD, end-stage renal disease, hypertension and urinary retention who presents for preop evaluation.       Past Medical History:   Diagnosis Date    Chronic indwelling Moss catheter     COPD (chronic obstructive pulmonary disease) (HCC)     Dependence on renal dialysis (HCC)     ESRD (end stage renal disease) (HCC)     Former smoker     Hemodialysis patient (HCC)     Herpes zoster with ophthalmic complication     Hip pain     History of blood transfusion     HTN (hypertension)     Hydronephrosis     Hyperlipidemia     Shingles     resolved    Urinary retention      Past Surgical History:   Procedure Laterality Date    DIALYSIS CATHETER INSERTION      DIALYSIS FISTULA CREATION Left 2/28/2024    LEFT RADIAL CEPHALIC ARTERIAL VENOUS  FISTULA CREATION performed by Simon Hauser MD at [facility] OR    PORT SURGERY       Current Outpatient Medications   Medication Sig Dispense Refill    Cephalexin (KEFLEX PO) Take by mouth      ferric citrate (AURYXIA) 1  MG(Fe) TABS tablet Take 1 tablet by mouth 3 times daily (with meals)      atorvastatin (LIPITOR) 20 MG tablet Take 1 tablet by mouth daily (Patient taking differently: Take 1 tablet by mouth at bedtime) 90 tablet 3    carvedilol (COREG) 6.25 MG tablet Take 1 tablet by mouth 2 times daily      NIFEdipine (ADALAT CC) 90 MG extended release tablet Take 1 tablet by mouth at bedtime      tamsulosin (FLOMAX) 0.4 MG capsule Take by mouth daily       Current Facility-Administered Medications   Medication Dose Route Frequency Provider Last Rate Last Admin    lidocaine 2 % injection 4 mL  4 mL IntraDERmal Once Abilio Morgan

## 2024-06-06 ENCOUNTER — TELEPHONE (OUTPATIENT)
Dept: FAMILY MEDICINE CLINIC | Age: 69
End: 2024-06-06

## 2024-06-06 NOTE — TELEPHONE ENCOUNTER
FYI only    This creatinine, while elevated, is expected given patient's previous creatinine levels.  Patient is having a procedure to address this.

## 2024-06-07 ENCOUNTER — TELEPHONE (OUTPATIENT)
Dept: FAMILY MEDICINE CLINIC | Age: 69
End: 2024-06-07

## 2024-06-07 LAB
BACTERIA UR CULT: ABNORMAL
BACTERIA UR CULT: ABNORMAL
ORGANISM: ABNORMAL
ORGANISM: ABNORMAL

## 2024-06-07 NOTE — TELEPHONE ENCOUNTER
LVM to pt that we faxed over pre op pw. Pt was notified to contact urology for further discussion.

## 2024-06-07 NOTE — TELEPHONE ENCOUNTER
Please inform the patient that we've faxed copies of the labs requested by his surgeon directly back to the surgeon. I noted some findings indicating a potential urinary tract infection in his urine studies, which I've brought to the attention of his surgeon. It's important for him to follow-up with the urology office for further discussion and management. Thanks!

## 2024-06-18 NOTE — PROGRESS NOTES
Surgery Date and Time: 6/21/24 @9AM   Arrival Time: 7AM    The instructions given when and if a patient needs to stop oral intake prior to surgery varies. Follow the instructions you were given by your    Surgeon or RN during the Pre-op call.       __X__Nothing to eat or to drink after Midnight the night before the surgery. NO gum, mints, candy or ice chips day of surgery.                                Only take the following medications with a small sip of water the morning of surgery: Coreg                 Hold the following medications (per anesthesia or surgeon request):None          Last Dose:      Aspirin, Ibuprofen, Advil, Naproxen, Vitamin E and other Anti-inflammatory products and supplements should be stopped for 5 -7days before surgery      or as directed by your physician.      Check with your Surgeon/PCP/Cardiologist regarding stopping Plavix, Coumadin, Eliquis, Lovenox, Effient, Pradaxa, Xarelto, Fragmin or other blood thinners     and follow their instructions.  Medication:     None            Last dose:                - If you take a long acting-insulin, please ask your Primary Care Physician if you should decrease your dose the night before surgery.    - Do not smoke or vape, and do not drink any alcoholic beverages 24 hours prior to surgery, this includes NA Beer. Refrain from using any recreational drugs,     including non-prescribed prescription drugs.     -You may brush your teeth and gargle the morning of surgery.  DO NOT SWALLOW WATER.    -You MUST plan for a responsible adult to stay on site while you are here and take you home after your surgery. You will not be allowed to leave alone or drive               yourself home. It is requested someone stay with you the first 24 hrs. Your surgery will be cancelled if you do not have a ride home with a responsible adult.    -A parent/legal guardian must accompany a child scheduled for surgery and plan to stay at the hospital until the child is  given time.              -Please bring your picture ID and insurance card for registration prior to arriving to second floor surgery department.              -If you use a CPAP/BiPAP please bring with you on the day of the surgery. If you use oxygen, please bring portable tank with you.              -For your convenience [facility] has an outpatient pharmacy on site to fill your prescriptions prior to 5 pm.              -Bring a complete list of all your medications with name and dose including any supplements.              Visitor policy: May have 2-3 visitors in Surgery Waiting Room. Visiting hours are 8a-8p. Overnight visitors will be at the discretion of the unit nurse.    No visitors under the age of 14 permitted.     *Please call [facility] Preadmission Testing Department for any further questions  [phone]    [facility] - MAIN ENTRANCE   [address] [place] [address]   [id number]        Email sent: [email or web address]

## 2024-06-20 ENCOUNTER — ANESTHESIA EVENT (OUTPATIENT)
Dept: OPERATING ROOM | Age: 69
End: 2024-06-20
Payer: COMMERCIAL

## 2024-06-21 ENCOUNTER — HOSPITAL ENCOUNTER (OUTPATIENT)
Age: 69
Setting detail: OUTPATIENT SURGERY
Discharge: HOME | End: 2024-06-21
Attending: UROLOGY | Admitting: UROLOGY
Payer: COMMERCIAL

## 2024-06-21 ENCOUNTER — ANESTHESIA (OUTPATIENT)
Dept: OPERATING ROOM | Age: 69
End: 2024-06-21
Payer: COMMERCIAL

## 2024-06-21 VITALS
BODY MASS INDEX: 26.12 KG/M2 | DIASTOLIC BLOOD PRESSURE: 73 MMHG | TEMPERATURE: 97 F | WEIGHT: 176.37 LBS | HEIGHT: 69 IN | HEART RATE: 88 BPM | SYSTOLIC BLOOD PRESSURE: 113 MMHG | RESPIRATION RATE: 15 BRPM | OXYGEN SATURATION: 100 %

## 2024-06-21 DIAGNOSIS — N40.1 BENIGN PROSTATIC HYPERPLASIA WITH LOWER URINARY TRACT SYMPTOMS, SYMPTOM DETAILS UNSPECIFIED: ICD-10-CM

## 2024-06-21 DIAGNOSIS — G89.18 POST-OP PAIN: Primary | ICD-10-CM

## 2024-06-21 PROCEDURE — 3600000014 HC SURGERY LEVEL 4 ADDTL 15MIN: Performed by: UROLOGY

## 2024-06-21 PROCEDURE — 7100000011 HC PHASE II RECOVERY - ADDTL 15 MIN: Performed by: UROLOGY

## 2024-06-21 PROCEDURE — A4217 STERILE WATER/SALINE, 500 ML: HCPCS | Performed by: UROLOGY

## 2024-06-21 PROCEDURE — 6360000002 HC RX W HCPCS: Performed by: ANESTHESIOLOGY

## 2024-06-21 PROCEDURE — 2580000003 HC RX 258: Performed by: ANESTHESIOLOGY

## 2024-06-21 PROCEDURE — 7100000000 HC PACU RECOVERY - FIRST 15 MIN: Performed by: UROLOGY

## 2024-06-21 PROCEDURE — 2709999900 HC NON-CHARGEABLE SUPPLY: Performed by: UROLOGY

## 2024-06-21 PROCEDURE — 6370000000 HC RX 637 (ALT 250 FOR IP): Performed by: ANESTHESIOLOGY

## 2024-06-21 PROCEDURE — 6360000002 HC RX W HCPCS: Performed by: UROLOGY

## 2024-06-21 PROCEDURE — 3700000000 HC ANESTHESIA ATTENDED CARE: Performed by: UROLOGY

## 2024-06-21 PROCEDURE — 2500000003 HC RX 250 WO HCPCS

## 2024-06-21 PROCEDURE — 2580000003 HC RX 258: Performed by: UROLOGY

## 2024-06-21 PROCEDURE — 7100000010 HC PHASE II RECOVERY - FIRST 15 MIN: Performed by: UROLOGY

## 2024-06-21 PROCEDURE — 6360000002 HC RX W HCPCS

## 2024-06-21 PROCEDURE — 3700000001 HC ADD 15 MINUTES (ANESTHESIA): Performed by: UROLOGY

## 2024-06-21 PROCEDURE — 7100000001 HC PACU RECOVERY - ADDTL 15 MIN: Performed by: UROLOGY

## 2024-06-21 PROCEDURE — 88305 TISSUE EXAM BY PATHOLOGIST: CPT

## 2024-06-21 PROCEDURE — 3600000004 HC SURGERY LEVEL 4 BASE: Performed by: UROLOGY

## 2024-06-21 RX ORDER — NALOXONE HYDROCHLORIDE 0.4 MG/ML
INJECTION, SOLUTION INTRAMUSCULAR; INTRAVENOUS; SUBCUTANEOUS PRN
Status: DISCONTINUED | OUTPATIENT
Start: 2024-06-21 | End: 2024-06-21 | Stop reason: HOSPADM

## 2024-06-21 RX ORDER — SODIUM CHLORIDE 0.9 % (FLUSH) 0.9 %
5-40 SYRINGE (ML) INJECTION EVERY 12 HOURS SCHEDULED
Status: DISCONTINUED | OUTPATIENT
Start: 2024-06-21 | End: 2024-06-21 | Stop reason: HOSPADM

## 2024-06-21 RX ORDER — ONDANSETRON 2 MG/ML
INJECTION INTRAMUSCULAR; INTRAVENOUS PRN
Status: DISCONTINUED | OUTPATIENT
Start: 2024-06-21 | End: 2024-06-21 | Stop reason: SDUPTHER

## 2024-06-21 RX ORDER — DIPHENHYDRAMINE HYDROCHLORIDE 50 MG/ML
6.25 INJECTION INTRAMUSCULAR; INTRAVENOUS
Status: DISCONTINUED | OUTPATIENT
Start: 2024-06-21 | End: 2024-06-21 | Stop reason: HOSPADM

## 2024-06-21 RX ORDER — SODIUM CHLORIDE 0.9 % (FLUSH) 0.9 %
5-40 SYRINGE (ML) INJECTION PRN
Status: DISCONTINUED | OUTPATIENT
Start: 2024-06-21 | End: 2024-06-21 | Stop reason: HOSPADM

## 2024-06-21 RX ORDER — METOPROLOL TARTRATE 1 MG/ML
INJECTION, SOLUTION INTRAVENOUS PRN
Status: DISCONTINUED | OUTPATIENT
Start: 2024-06-21 | End: 2024-06-21 | Stop reason: SDUPTHER

## 2024-06-21 RX ORDER — OXYCODONE HYDROCHLORIDE 5 MG/1
5 TABLET ORAL PRN
Status: DISCONTINUED | OUTPATIENT
Start: 2024-06-21 | End: 2024-06-21 | Stop reason: HOSPADM

## 2024-06-21 RX ORDER — PHENYLEPHRINE HCL IN 0.9% NACL 1 MG/10 ML
SYRINGE (ML) INTRAVENOUS PRN
Status: DISCONTINUED | OUTPATIENT
Start: 2024-06-21 | End: 2024-06-21 | Stop reason: SDUPTHER

## 2024-06-21 RX ORDER — LIDOCAINE HYDROCHLORIDE 20 MG/ML
INJECTION, SOLUTION INFILTRATION; PERINEURAL PRN
Status: DISCONTINUED | OUTPATIENT
Start: 2024-06-21 | End: 2024-06-21 | Stop reason: SDUPTHER

## 2024-06-21 RX ORDER — ONDANSETRON 2 MG/ML
4 INJECTION INTRAMUSCULAR; INTRAVENOUS ONCE
Status: DISCONTINUED | OUTPATIENT
Start: 2024-06-21 | End: 2024-06-21 | Stop reason: HOSPADM

## 2024-06-21 RX ORDER — MIDAZOLAM HYDROCHLORIDE 1 MG/ML
INJECTION INTRAMUSCULAR; INTRAVENOUS PRN
Status: DISCONTINUED | OUTPATIENT
Start: 2024-06-21 | End: 2024-06-21 | Stop reason: SDUPTHER

## 2024-06-21 RX ORDER — MIDAZOLAM HYDROCHLORIDE 1 MG/ML
2 INJECTION INTRAMUSCULAR; INTRAVENOUS
Status: DISCONTINUED | OUTPATIENT
Start: 2024-06-21 | End: 2024-06-21 | Stop reason: HOSPADM

## 2024-06-21 RX ORDER — OXYCODONE HYDROCHLORIDE 5 MG/1
10 TABLET ORAL PRN
Status: DISCONTINUED | OUTPATIENT
Start: 2024-06-21 | End: 2024-06-21 | Stop reason: HOSPADM

## 2024-06-21 RX ORDER — ESMOLOL HYDROCHLORIDE 10 MG/ML
INJECTION INTRAVENOUS PRN
Status: DISCONTINUED | OUTPATIENT
Start: 2024-06-21 | End: 2024-06-21 | Stop reason: SDUPTHER

## 2024-06-21 RX ORDER — CIPROFLOXACIN 500 MG/1
500 TABLET, FILM COATED ORAL 2 TIMES DAILY
Qty: 6 TABLET | Refills: 0 | Status: SHIPPED | OUTPATIENT
Start: 2024-06-21 | End: 2024-06-24

## 2024-06-21 RX ORDER — SODIUM CHLORIDE, SODIUM LACTATE, POTASSIUM CHLORIDE, CALCIUM CHLORIDE 600; 310; 30; 20 MG/100ML; MG/100ML; MG/100ML; MG/100ML
INJECTION, SOLUTION INTRAVENOUS CONTINUOUS
Status: DISCONTINUED | OUTPATIENT
Start: 2024-06-21 | End: 2024-06-21 | Stop reason: HOSPADM

## 2024-06-21 RX ORDER — DEXAMETHASONE SODIUM PHOSPHATE 4 MG/ML
INJECTION, SOLUTION INTRA-ARTICULAR; INTRALESIONAL; INTRAMUSCULAR; INTRAVENOUS; SOFT TISSUE PRN
Status: DISCONTINUED | OUTPATIENT
Start: 2024-06-21 | End: 2024-06-21 | Stop reason: SDUPTHER

## 2024-06-21 RX ORDER — PROPOFOL 10 MG/ML
INJECTION, EMULSION INTRAVENOUS PRN
Status: DISCONTINUED | OUTPATIENT
Start: 2024-06-21 | End: 2024-06-21 | Stop reason: SDUPTHER

## 2024-06-21 RX ORDER — TRAMADOL HYDROCHLORIDE 50 MG/1
50 TABLET ORAL EVERY 6 HOURS PRN
Qty: 12 TABLET | Refills: 0 | Status: SHIPPED | OUTPATIENT
Start: 2024-06-21 | End: 2024-06-24

## 2024-06-21 RX ORDER — SODIUM CHLORIDE 9 MG/ML
INJECTION, SOLUTION INTRAVENOUS PRN
Status: DISCONTINUED | OUTPATIENT
Start: 2024-06-21 | End: 2024-06-21 | Stop reason: HOSPADM

## 2024-06-21 RX ORDER — FENTANYL CITRATE 50 UG/ML
INJECTION, SOLUTION INTRAMUSCULAR; INTRAVENOUS PRN
Status: DISCONTINUED | OUTPATIENT
Start: 2024-06-21 | End: 2024-06-21 | Stop reason: SDUPTHER

## 2024-06-21 RX ORDER — CEFAZOLIN SODIUM IN 0.9 % NACL 2 G/100 ML
2000 PLASTIC BAG, INJECTION (ML) INTRAVENOUS
Status: COMPLETED | OUTPATIENT
Start: 2024-06-21 | End: 2024-06-21

## 2024-06-21 RX ORDER — ROCURONIUM BROMIDE 10 MG/ML
INJECTION, SOLUTION INTRAVENOUS PRN
Status: DISCONTINUED | OUTPATIENT
Start: 2024-06-21 | End: 2024-06-21 | Stop reason: SDUPTHER

## 2024-06-21 RX ORDER — MAGNESIUM HYDROXIDE 1200 MG/15ML
LIQUID ORAL CONTINUOUS PRN
Status: DISCONTINUED | OUTPATIENT
Start: 2024-06-21 | End: 2024-06-21 | Stop reason: HOSPADM

## 2024-06-21 RX ADMIN — METOPROLOL TARTRATE 2 MG: 1 INJECTION, SOLUTION INTRAVENOUS at 09:44

## 2024-06-21 RX ADMIN — Medication 50 MCG: at 09:27

## 2024-06-21 RX ADMIN — SODIUM CHLORIDE: 9 INJECTION, SOLUTION INTRAVENOUS at 08:47

## 2024-06-21 RX ADMIN — PROPOFOL 50 MG: 10 INJECTION, EMULSION INTRAVENOUS at 08:55

## 2024-06-21 RX ADMIN — Medication 50 MCG: at 09:04

## 2024-06-21 RX ADMIN — MIDAZOLAM 2 MG: 1 INJECTION INTRAMUSCULAR; INTRAVENOUS at 08:47

## 2024-06-21 RX ADMIN — PROPOFOL 100 MG: 10 INJECTION, EMULSION INTRAVENOUS at 08:52

## 2024-06-21 RX ADMIN — HYDROMORPHONE HYDROCHLORIDE 0.5 MG: 1 INJECTION, SOLUTION INTRAMUSCULAR; INTRAVENOUS; SUBCUTANEOUS at 11:21

## 2024-06-21 RX ADMIN — LIDOCAINE HYDROCHLORIDE 60 MG: 20 INJECTION, SOLUTION INFILTRATION; PERINEURAL at 08:52

## 2024-06-21 RX ADMIN — HYDROMORPHONE HYDROCHLORIDE 0.5 MG: 1 INJECTION, SOLUTION INTRAMUSCULAR; INTRAVENOUS; SUBCUTANEOUS at 10:59

## 2024-06-21 RX ADMIN — ROCURONIUM BROMIDE 50 MG: 50 INJECTION, SOLUTION INTRAVENOUS at 08:52

## 2024-06-21 RX ADMIN — ESMOLOL HYDROCHLORIDE 30 MG: 100 INJECTION, SOLUTION INTRAVENOUS at 08:59

## 2024-06-21 RX ADMIN — ESMOLOL HYDROCHLORIDE 20 MG: 100 INJECTION, SOLUTION INTRAVENOUS at 09:11

## 2024-06-21 RX ADMIN — FENTANYL CITRATE 50 MCG: 50 INJECTION, SOLUTION INTRAMUSCULAR; INTRAVENOUS at 10:15

## 2024-06-21 RX ADMIN — SUGAMMADEX 200 MG: 100 INJECTION, SOLUTION INTRAVENOUS at 10:25

## 2024-06-21 RX ADMIN — FENTANYL CITRATE 100 MCG: 50 INJECTION, SOLUTION INTRAMUSCULAR; INTRAVENOUS at 08:55

## 2024-06-21 RX ADMIN — Medication 200 MCG: at 09:05

## 2024-06-21 RX ADMIN — ESMOLOL HYDROCHLORIDE 15 MG: 100 INJECTION, SOLUTION INTRAVENOUS at 09:30

## 2024-06-21 RX ADMIN — ESMOLOL HYDROCHLORIDE 15 MG: 100 INJECTION, SOLUTION INTRAVENOUS at 09:21

## 2024-06-21 RX ADMIN — OXYCODONE HYDROCHLORIDE 10 MG: 5 TABLET ORAL at 11:57

## 2024-06-21 RX ADMIN — METOPROLOL TARTRATE 2 MG: 1 INJECTION, SOLUTION INTRAVENOUS at 09:57

## 2024-06-21 RX ADMIN — Medication 2000 MG: at 09:02

## 2024-06-21 RX ADMIN — ONDANSETRON 4 MG: 2 INJECTION INTRAMUSCULAR; INTRAVENOUS at 09:11

## 2024-06-21 RX ADMIN — DEXAMETHASONE SODIUM PHOSPHATE 8 MG: 4 INJECTION, SOLUTION INTRAMUSCULAR; INTRAVENOUS at 09:11

## 2024-06-21 ASSESSMENT — PAIN SCALES - GENERAL
PAINLEVEL_OUTOF10: 6
PAINLEVEL_OUTOF10: 0
PAINLEVEL_OUTOF10: 5
PAINLEVEL_OUTOF10: 7

## 2024-06-21 ASSESSMENT — PAIN DESCRIPTION - DESCRIPTORS
DESCRIPTORS: ACHING;DISCOMFORT
DESCRIPTORS: ACHING;DISCOMFORT
DESCRIPTORS: ACHING;BURNING

## 2024-06-21 ASSESSMENT — PAIN - FUNCTIONAL ASSESSMENT
PAIN_FUNCTIONAL_ASSESSMENT: PREVENTS OR INTERFERES SOME ACTIVE ACTIVITIES AND ADLS
PAIN_FUNCTIONAL_ASSESSMENT: NONE - DENIES PAIN

## 2024-06-21 ASSESSMENT — PAIN DESCRIPTION - FREQUENCY: FREQUENCY: CONTINUOUS

## 2024-06-21 ASSESSMENT — PAIN DESCRIPTION - PAIN TYPE: TYPE: ACUTE PAIN

## 2024-06-21 ASSESSMENT — PAIN DESCRIPTION - ORIENTATION
ORIENTATION: MID
ORIENTATION: MID

## 2024-06-21 ASSESSMENT — PAIN DESCRIPTION - LOCATION
LOCATION: PENIS

## 2024-06-21 ASSESSMENT — PAIN DESCRIPTION - ONSET: ONSET: ON-GOING

## 2024-06-21 NOTE — DISCHARGE INSTRUCTIONS
[facility]      Cystoscopy Discharge Instructions    Routine han care.      You may experience : Burning sensation when you void     A feeling of a need to go to the bathroom frequently     Your urine may be blood tinged    These symptoms should be relieved within a few hours as you increase the amounts of fluids you drink and the number of times that you empty your bladder.  We recommended that you drink plenty of fluid a few days after surgery.    No strenuous activities until you talk to your doctor.    You may feel light headed up to 24 hours after anesthesia.  You should not do the following for the next 24 hours.       Drive a car, operate machinery or power tools     Drink any alcoholic drinks (not even beer or wine)     Make any important decisions, i.e., signing important papers.    It is recommended that you begin with clear liquids and/or light foods.  If you  Are not nauseated, progress to your normal diet.    I you are unable to urinate you should call your surgeon.    Dr. Anderson   ANESTHESIA DISCHARGE INSTRUCTIONS    You are under the influence of drugs- do not drink alcohol, drive a car, operate machinery(such as power tools, kitchen appliances, etc), sign legal documents, or make any important decisions for 24 hours (or while on pain medications).   Children should not ride bikes or skate boards or play on gym sets  for 24 hours after surgery.  A responsible adult should be with you for 24 hours.  Rest at home today- increase activity as tolerated.  Progress slowly to a regular diet unless your physician has instructed you otherwise. Drink plenty of water.    CALL YOUR DOCTOR IF YOU:  Have moderate to severe nausea or vomiting AND are unable to hold down fluids or prescribed medications.  Have bright red bloody drainage from your dressing that won't stop oozing.  Do not get relief with your pain medication    NORMAL (POSSIBLE) SIDE EFFECTS FROM ANESTHESIA:     Confusion, temporary memory loss,

## 2024-06-21 NOTE — PROGRESS NOTES
Pt admitted to [place] [address] for pre-op. Procedure and consent confirmed. Pt NPO since 06/20/2024 1830. Left arm w/ red alert sleeve on. Left AVF w/ + thrill. Pt's belongings placed in bag and to be placed on cart for PACU. Pt's glasses and cellphone to be placed in belongings bag when pt goes to OR.

## 2024-06-21 NOTE — ANESTHESIA PRE PROCEDURE
Department of Anesthesiology  Preprocedure Note       Name:  Milan Peres   Age:  68 y.o.  :  1955                                          MRN:  [id number]         Date:  2024      Surgeon: Surgeon(s):  Daren Anderson MD    Procedure: Procedure(s):  CYSTOSCOPY, TRANSURETHRAL RESECTION OF THE  PROSTATE    Medications prior to admission:   Prior to Admission medications    Medication Sig Start Date End Date Taking? Authorizing Provider   Amoxicillin-Pot Clavulanate (AUGMENTIN PO) Take by mouth in the morning and at bedtime   Yes Velma Meza MD B Complex-C-Folic Acid (RENAL-SILVIA PO) Take by mouth every morning   Yes Velma Meza MD   ferric citrate (AURYXIA) 1  MG(Fe) TABS tablet Take 1 tablet by mouth 3 times daily (with meals)    Velma Meza MD   atorvastatin (LIPITOR) 20 MG tablet Take 1 tablet by mouth daily  Patient taking differently: Take 1 tablet by mouth at bedtime 24   Abilio Morgan Jr., MD   carvedilol (COREG) 6.25 MG tablet Take 1 tablet by mouth 2 times daily Take AM 24 surgery Day 9/15/23   Velma Meza MD   NIFEdipine (ADALAT CC) 90 MG extended release tablet Take 1 tablet by mouth at bedtime 23   Velma Meza MD   tamsulosin (FLOMAX) 0.4 MG capsule Take by mouth every evening 23   Velma Meza MD       Current medications:    Current Facility-Administered Medications   Medication Dose Route Frequency Provider Last Rate Last Admin   • ceFAZolin (ANCEF) 2000 mg in 0.9% sodium chloride 100 mL IVPB  2,000 mg IntraVENous On Call to OR Daren Anderson MD       • lactated ringers IV soln infusion   IntraVENous Continuous Hiram Hanson MD       • sodium chloride flush 0.9 % injection 5-40 mL  5-40 mL IntraVENous 2 times per day Hiram Hanson MD       • sodium chloride flush 0.9 % injection 5-40 mL  5-40 mL IntraVENous PRN Hiram Hanson MD       • 0.9 % sodium chloride infusion   IntraVENous PRN Hiram Hanson MD

## 2024-06-21 NOTE — ANESTHESIA POSTPROCEDURE EVALUATION
Department of Anesthesiology  Postprocedure Note    Patient: Milan Peres  MRN: [id number]  YOB: 1955  Date of evaluation: 6/21/2024    Procedure Summary       Date: 06/21/24 Room / Location: [address]    Anesthesia Start: 0847 Anesthesia Stop: 1034    Procedure: CYSTOSCOPY, TRANSURETHRAL RESECTION OF THE  PROSTATE (Perineum) Diagnosis:       Benign prostatic hyperplasia with lower urinary tract symptoms, symptom details unspecified      (Benign prostatic hyperplasia with lower urinary tract symptoms, symptom details unspecified [N40.1])    Surgeons: Daren Anderson MD Responsible Provider: Tima Smith MD    Anesthesia Type: General ASA Status: 3            Anesthesia Type: General    Karen Phase I: Karen Score: 9    Karen Phase II: Karen Score: 10    Anesthesia Post Evaluation    Comments: Anes Post-op Note    Name:    Milan Peres  MRN:      [id number]    Patient Vitals in the past 12 hrs:  06/21/24 1140, BP:113/73, Pulse:88, Resp:15, SpO2:100 %  06/21/24 1121, BP:115/86, Pulse:83, Resp:16, SpO2:97 %  06/21/24 1110, BP:(!) 126/110, Pulse:86, Resp:20, SpO2:96 %  06/21/24 1053, BP:123/72, Temp:97 °F (36.1 °C), Temp src:Temporal, Pulse:88, Resp:12, SpO2:96 %  06/21/24 0707, BP:(!) 143/84, Temp:97.5 °F (36.4 °C), Pulse:(!) 113, Resp:16, SpO2:99 %     LABS:    CBC  Lab Results       Component                Value               Date/Time                  WBC                      7.7                 06/05/2024 10:19 AM        HGB                      13.5                06/05/2024 10:19 AM        HCT                      39.6 (L)            06/05/2024 10:19 AM        PLT                      264                 06/05/2024 10:19 AM   RENAL  Lab Results       Component                Value               Date/Time                  NA                       137                 06/05/2024 10:19 AM        K                        4.2                 06/05/2024 10:19 AM

## 2024-06-21 NOTE — BRIEF OP NOTE
Brief Postoperative Note      Patient: Milan Peres  YOB: 1955  MRN: [id number]    Date of Procedure: 6/21/2024    Pre-Op Diagnosis Codes:     * Benign prostatic hyperplasia with lower urinary tract symptoms, symptom details unspecified [N40.1], urinary retention    Post-Op Diagnosis: Same       Procedure(s):  CYSTOSCOPY, TRANSURETHRAL RESECTION OF THE  PROSTATE    Surgeon(s):  Daren Anderson MD    Assistant:  Surgical Assistant: Nano Mitchell    Anesthesia: General    Estimated Blood Loss (mL): less than 50     Complications: None    Specimens:   ID Type Source Tests Collected by Time Destination   A : PROSTATE CHIPS Tissue Prostate SURGICAL PATHOLOGY Daren Anderson MD 6/21/2024 0901        Implants:  * No implants in log *      Drains:   Urinary Catheter 06/21/24 3 Way (Active)       Findings:  Infection Present At Time Of Surgery (PATOS) (choose all levels that have infection present):  No infection present  Other Findings: bilobar BPH, right stent    Plan- nursing visit Monday for han removal, needs cysto right stent removal and retrograde in 3 months    Electronically signed by Daren Anderson MD on 6/21/2024 at 10:46 AM

## 2024-06-21 NOTE — OP NOTE
[address] [address]                            OPERATIVE REPORT      PATIENT NAME: NEREIDA SOUSA                : 1955  MED REC NO: [id number]                      ROOM: OR [place]  ACCOUNT NO: [id number]                       ADMIT DATE: 2024  PROVIDER: Daren Anderson MD      DATE OF PROCEDURE:  2024    SURGEON:  Daren Anderson MD    PREOPERATIVE DIAGNOSES:  Urinary retention, renal failure.    POSTOPERATIVE DIAGNOSES:  Urinary retention, renal failure.    PROCEDURES PERFORMED:  Cystoscopy, transurethral resection of the prostate.    INDICATION FOR THE PROCEDURE:  The patient is a 68-year-old male, who has had renal failure.  He is on dialysis, has retention.  Prostate was measured to be 76 g.  He has had a previous right stent placed.  The risks and benefits of a cystoscopy and transurethral resection of the prostate were discussed with the patient.  He agreed to proceed.    DESCRIPTION OF PROCEDURE:  The patient had preoperative antibiotics, general anesthesia, was in lithotomy position.  Genitalia were prepped and draped in the usual sterile fashion.  21-East [place]orese rigid cystoscope was inserted into the patient's urethra.  The patient has bilobar hypertrophy.  I then placed the 24-[place]orese bipolar loop resectoscope.  I resected the right lobe segment at 6 o'clock position going clockwise up to the 12 o'clock position.  I did not go distal to verumontanum and made sure there was good hemostasis.  I then resected the left lobe starting at 6 o'clock proceeding counter-clockwise over to the 12 o'clock position.  I resected for over an hour.  I washed out all the chips, and then I placed a 24-[place]orese 3-way catheter, and he was sent to the recovery room with continuous bladder irrigation.  He tolerated the procedure well, given prescriptions for pain pills and antibiotics.    PLAN:  He has a nursing visit to get his catheter out

## 2024-06-24 ENCOUNTER — HOSPITAL ENCOUNTER (EMERGENCY)
Age: 69
Discharge: HOME | End: 2024-06-24
Attending: EMERGENCY MEDICINE
Payer: COMMERCIAL

## 2024-06-24 VITALS
HEART RATE: 107 BPM | OXYGEN SATURATION: 98 % | DIASTOLIC BLOOD PRESSURE: 79 MMHG | TEMPERATURE: 98.6 F | SYSTOLIC BLOOD PRESSURE: 158 MMHG | RESPIRATION RATE: 18 BRPM

## 2024-06-24 DIAGNOSIS — R33.9 ACUTE ON CHRONIC URINARY RETENTION: Primary | ICD-10-CM

## 2024-06-24 LAB
BACTERIA URNS QL MICRO: ABNORMAL /HPF
BILIRUB UR QL STRIP.AUTO: NEGATIVE
CLARITY UR: CLEAR
COLOR UR: ABNORMAL
GLUCOSE UR STRIP.AUTO-MCNC: NEGATIVE MG/DL
HGB UR QL STRIP.AUTO: ABNORMAL
KETONES UR STRIP.AUTO-MCNC: NEGATIVE MG/DL
LEUKOCYTE ESTERASE UR QL STRIP.AUTO: ABNORMAL
NITRITE UR QL STRIP.AUTO: NEGATIVE
PH UR STRIP.AUTO: 7 [PH] (ref 5–8)
PROT UR STRIP.AUTO-MCNC: 100 MG/DL
RBC #/AREA URNS HPF: ABNORMAL /HPF (ref 0–4)
SP GR UR STRIP.AUTO: 1.01 (ref 1–1.03)
UA COMPLETE W REFLEX CULTURE PNL UR: ABNORMAL
UA DIPSTICK W REFLEX MICRO PNL UR: YES
URN SPEC COLLECT METH UR: ABNORMAL
UROBILINOGEN UR STRIP-ACNC: 0.2 E.U./DL
WBC #/AREA URNS HPF: ABNORMAL /HPF (ref 0–5)

## 2024-06-24 PROCEDURE — 81001 URINALYSIS AUTO W/SCOPE: CPT

## 2024-06-24 PROCEDURE — 99283 EMERGENCY DEPT VISIT LOW MDM: CPT

## 2024-06-24 ASSESSMENT — PAIN - FUNCTIONAL ASSESSMENT: PAIN_FUNCTIONAL_ASSESSMENT: 0-10

## 2024-06-24 ASSESSMENT — PAIN DESCRIPTION - LOCATION: LOCATION: PELVIS

## 2024-06-25 NOTE — ED PROVIDER NOTES
genitalia.    DIAGNOSTIC RESULTS   LABS:   Labs Reviewed   URINALYSIS WITH REFLEX TO CULTURE - Abnormal; Notable for the following components:       Result Value    Blood, Urine LARGE (*)     Protein,  (*)     Leukocyte Esterase, Urine SMALL (*)     All other components within normal limits   MICROSCOPIC URINALYSIS - Abnormal; Notable for the following components:    RBC, UA 5-10 (*)     Bacteria, UA Rare (*)     All other components within normal limits      When ordered only abnormal lab results are displayed. All other labs were within normal range or not returned as of this dictation.     PROCEDURE BURT CATHETER PLACEMENT  Patient gave verbal consent for the procedure.  Area was prepped and draped in sterile fashion.  16 Palestinian Burt catheter was placed.  Balloon was inflated with 10 cc of sterile water.  Patient tolerated procedure well with minimal discomfort no bleeding.  Over 600 cc light yellow urine returned.    EMERGENCY DEPARTMENT COURSE and DIFFERENTIAL DIAGNOSIS/MDM:     Vitals:    Vitals:    06/24/24 1952   BP: (!) 158/79   Pulse: (!) 107   Resp: 18   Temp: 98.6 °F (37 °C)   SpO2: 98%        Patient was given the following medications:   Medications - No data to display          CC/HPI Summary, DDx, ED Course, and Reassessment: I advised patient that it appears he is retaining urine again and we are discharging him with a Burt catheter and leg bag.  Advised follow-up with his urologist and return here for any fever or vomiting or pain.  Chronic Conditions: Chronic urinary retention    I am the primary physician of Record.     FINAL IMPRESSION    1. Acute on chronic urinary retention         DISPOSITION/PLAN   DISPOSITION Decision To Discharge 06/24/2024 08:51:15 PM       PATIENT REFERRED TO:   Daren Anderson MD  [address]  [facility] [address]  [phone]    Call in 1 day      [facility]  ED  [address]  [facility] [address] [phone]  Go to

## 2024-08-09 ENCOUNTER — OFFICE VISIT (OUTPATIENT)
Dept: FAMILY MEDICINE CLINIC | Age: 69
End: 2024-08-09

## 2024-08-09 VITALS
OXYGEN SATURATION: 97 % | BODY MASS INDEX: 26.7 KG/M2 | WEIGHT: 180.8 LBS | HEART RATE: 99 BPM | DIASTOLIC BLOOD PRESSURE: 62 MMHG | SYSTOLIC BLOOD PRESSURE: 122 MMHG

## 2024-08-09 DIAGNOSIS — J44.9 STAGE 2 MODERATE COPD BY GOLD CLASSIFICATION (HCC): Primary | ICD-10-CM

## 2024-08-09 DIAGNOSIS — J41.8 MIXED SIMPLE AND MUCOPURULENT CHRONIC BRONCHITIS (HCC): ICD-10-CM

## 2024-08-09 DIAGNOSIS — Z87.891 PERSONAL HISTORY OF TOBACCO USE: ICD-10-CM

## 2024-08-09 DIAGNOSIS — M70.71 BURSITIS OF RIGHT HIP, UNSPECIFIED BURSA: ICD-10-CM

## 2024-08-09 DIAGNOSIS — M70.62 GREATER TROCHANTERIC BURSITIS OF LEFT HIP: ICD-10-CM

## 2024-08-09 RX ORDER — ALBUTEROL SULFATE 90 UG/1
2 AEROSOL, METERED RESPIRATORY (INHALATION) 4 TIMES DAILY PRN
Qty: 18 G | Refills: 0 | Status: SHIPPED | OUTPATIENT
Start: 2024-08-09

## 2024-08-09 RX ORDER — UMECLIDINIUM BROMIDE AND VILANTEROL TRIFENATATE 62.5; 25 UG/1; UG/1
1 POWDER RESPIRATORY (INHALATION) DAILY
Qty: 14 EACH | Refills: 0 | Status: SHIPPED | OUTPATIENT
Start: 2024-08-09

## 2024-08-09 ASSESSMENT — ENCOUNTER SYMPTOMS
CONSTIPATION: 0
DIARRHEA: 0
SHORTNESS OF BREATH: 0
COUGH: 1

## 2024-08-09 NOTE — PROGRESS NOTES
[facility]  [address],  [place] [address]    Assessment/Plan:    Milan was seen today for 2 month follow up.    Diagnoses and all orders for this visit:    Stage 2 moderate COPD by GOLD classification ([facility])  -     Spirometry With OR Without Bronchodilator  -     umeclidinium-vilanterol (ANORO ELLIPTA) 62.5-25 MCG/ACT inhaler; Inhale 1 puff into the lungs daily  -     albuterol sulfate HFA (VENTOLIN HFA) 108 (90 Base) MCG/ACT inhaler; Inhale 2 puffs into the lungs 4 times daily as needed for Wheezing  CAT:  score 12  mMRC Grade 1.  Gold classification B.  See scanned details.  Will order pulmonary function testing   Will begin LAMA LABA as per above with rescue RICKY  Will update pneumonia vaccine at next visit.    Bursitis of right hip, unspecified bursa  -     [facility] Physical [facility]OSR  -     XR HIP BILATERAL W AP PELVIS (2 VIEWS); Future  Continued symptoms.  Advised patient to have baseline hip imaging done as states has not had this done in past.  Discussed with patient benefit of physical therapy.  States that he will do so in the future.  Orders placed as per above.    Greater trochanteric bursitis of left hip  -     [facility] Physical Therapy [facility]OSR  -     XR HIP BILATERAL W AP PELVIS (2 VIEWS); Future    Personal history of tobacco use  -     [facility] VISIT TO DISCUSS LUNG CA SCREEN W LDCT  -     CT Lung Screen (Initial/Annual/Baseline); Future      Return in 3 weeks (on 8/30/2024) for Med check. .     Patient: Milan Peres is a pleasant 69 y.o.male who presents today for:      Chief Complaint   Patient presents with    2 month follow up     Hip pain       HPI:       COPD:  Currently managing condition with n/a.  Denies pulmonary function testing in last 5 years .  Has not seen pulmonology in the past.  Quit tobacco use 2023.  Currently denies increased cough frequency and severity.  Denies increase shortness of

## 2024-08-19 NOTE — PROGRESS NOTES
Milan White    Age 69 y.o.    male    1955    MRN [id number]    8/30/2024  Arrival Time_____________  OR Time____________44 Min     Procedure(s):  CYSTOSCOPY, RIGHT RETROGRADE, RIGHT  STENT REMOVAL AND SUPRAPUBIC CATHETER PLACEMENT                      General   Surgeon(s):  Daren Anderson, MD      DAY ADMIT ___  SDS/OP ___  OUTPT IN BED ___        Phone [phone] (home) [phone] (work)                 PCP _____________________ Phone_________________ Epic ( ) Epic CE ( ) Appt ________    NOTES: _________________________________________ Consult/Cardio _______________    ____________________________________________________________________________    ____________________________________________________________________________  PAT APPT DATE:________ TIME: ________  FAXED QAD: _______  (__) H&P w/ Hospitalist    (__) PAT orders in EPIC    (__) Meet with PAT nurse  __________________________________________________________________________  Preop Nurse phone screen complete: _____________  (__) CBC     (__) W/ DIFF ___________  (__) CT CHEST  __________   (__) Hgb A1C    ___________  (__) CHEST X RAY   __________  (__) LIPID PROFILE  ___________  (__) EKG   __________  (__) PT-INR / APTT  ___________  (__) PFT's   __________  (__) BMP   ___________  (__) CAROTIDS  __________  (__) CMP   ___________  (__) VEIN MAPPING  __________  (__) U/A   ___________  ( X ) HISTORY & PHYSICAL __________  (__) URINE C & S  ___________  (__) CARDIAC CLEARANCE __________  (__) U/A W/ FLEX  ___________  (__) PULM. CLEARANCE __________  (__) SERUM PREGNANCY ___________  (__) Preop Orders in EPIC __________  (__) TYPE & SCREEN __________repeat ( ) (__)  __________________ __________  (__) Albumin   ___________  (__)  __________________ __________  (__) TRANSFERRIN  ___________  (__)  __________________ __________  (__) LIVER PROFILE  ___________  (__) URINE PREG DOS __________  (__) MRSA NASAL SWAB ___________  (__) BLOOD SUGAR  DOS __________  (__) SED RATE  ___________  (__) OAC  _________________________  (__) C-REACTIVE PROTEIN ___________    (__) VITAMIN D HYDROXY ___________  (__) BETABLOCKER  _________________                                                                                       (__) ACE/ARBS:__________________________    (__) GLP-1 Agonist ___________________                Ride home/Contact #_______________________   (__) SCLT2 inhibitor ___________________

## 2024-08-23 ENCOUNTER — OFFICE VISIT (OUTPATIENT)
Dept: FAMILY MEDICINE CLINIC | Age: 69
End: 2024-08-23

## 2024-08-23 VITALS
DIASTOLIC BLOOD PRESSURE: 64 MMHG | WEIGHT: 184.6 LBS | HEIGHT: 69 IN | OXYGEN SATURATION: 97 % | HEART RATE: 108 BPM | SYSTOLIC BLOOD PRESSURE: 128 MMHG | BODY MASS INDEX: 27.34 KG/M2

## 2024-08-23 DIAGNOSIS — Z01.818 PRE-OP EXAM: Primary | ICD-10-CM

## 2024-08-23 DIAGNOSIS — R33.9 URINARY RETENTION: ICD-10-CM

## 2024-08-23 NOTE — PROGRESS NOTES
[facility]  [phone]  Fax: [phone]   Pre-operative History and Physical    Milan Peres is a 69 y.o. male who is referred by Dr. Daren Anderson for a consultation for preoperative physical examination and medical clearance for surgery. Patient is scheduled to have Cystoscopy, right retrograde, right stent removal and suprapubic catheter placement at [facility] on 8/30/2024.    DIAGNOSIS:  urinary retention from enlarged prostate.         History Obtained From:  patient    HISTORY OF PRESENT ILLNESS:    The patient is a 69 y.o. male with significant past medical history of urinary retention/ resent TURP who presents for pre-op.        Past Medical History:   Diagnosis Date    Chronic indwelling Moss catheter     COPD (chronic obstructive pulmonary disease) ([facility])     Dependence on renal dialysis ([facility])     Mike,Thnae, Sat    ESRD (end stage renal disease) ([facility])     Former smoker     Hemodialysis patient ([facility])     Herpes zoster with ophthalmic complication     Hip pain     History of blood transfusion     HTN (hypertension)     Hx of blood clots     Hydronephrosis     Hyperlipidemia     Port-A-Cath in place     right chest    Shingles     resolved    Urinary retention     Vascular dialysis catheter in place ([facility])     left     Past Surgical History:   Procedure Laterality Date    COLONOSCOPY      DIALYSIS CATHETER INSERTION      DIALYSIS FISTULA CREATION Left 02/28/2024    LEFT RADIAL CEPHALIC ARTERIAL VENOUS  FISTULA CREATION performed by Simon Hauser MD at [facility] OR    PORT SURGERY      TURP N/A 6/21/2024    CYSTOSCOPY, TRANSURETHRAL RESECTION OF THE  PROSTATE performed by Daren Anderson MD at [facility] OR     Current Outpatient Medications   Medication Sig Dispense Refill    umeclidinium-vilanterol (ANORO ELLIPTA) 62.5-25 MCG/ACT inhaler Inhale 1 puff into the lungs daily 14 each 0    albuterol sulfate HFA (VENTOLIN HFA) 108 (90 Base) MCG/ACT inhaler Inhale 2 puffs into the lungs 4 times daily as needed for

## 2024-08-26 RX ORDER — SEVELAMER CARBONATE 800 MG/1
1 TABLET, FILM COATED ORAL
COMMUNITY

## 2024-08-26 NOTE — PROGRESS NOTES
Surgery Date and Time: 8/30/24 @ 07:30 am    Arrival Time:  06:00 am    The instructions given when and if a patient needs to stop oral intake prior to surgery varies. Follow the instructions you were given by your    Surgeon or RN during the Pre-op call.       __X__Nothing to eat or to drink after Midnight the night before the surgery. NO gum, mints, candy or ice chips day of surgery.                   Only take the following medications with a small sip of water the morning of surgery:  carvedilol, inhalers             Aspirin, Ibuprofen, Advil, Naproxen, Vitamin E and other Anti-inflammatory products and supplements should be stopped for 5 -7days before surgery      or as directed by your physician.     - Do not smoke or vape, and do not drink any alcoholic beverages 24 hours prior to surgery, this includes NA Beer. Refrain from using any recreational drugs,     including non-prescribed prescription drugs.     -You may brush your teeth and gargle the morning of surgery.  DO NOT SWALLOW WATER.    -You MUST plan for a responsible adult to stay on site while you are here and take you home after your surgery. You will not be allowed to leave alone or drive               yourself home. It is requested someone stay with you the first 24 hrs. Your surgery will be cancelled if you do not have a ride home with a responsible adult.    -A parent/legal guardian must accompany a child scheduled for surgery and plan to stay at the hospital until the child is discharged.  Please do not bring other                children with you.    -Please wear simple, loose-fitting clothing to the hospital. Do not bring valuables (money, credit cards, checkbooks, etc.) Do not wear any makeup (including                no eye makeup) and no nail polish if applicable.             - DO NOT wear any jewelry or body piercings day of surgery.  All body piercing jewelry must be removed.             - If you have dentures they will be removed  name and dose including any supplements.              Visitor policy: May have 2-3 visitors in Surgery Waiting Room. Visiting hours are 8a-8p. Overnight visitors will be at the discretion of the unit nurse.    No visitors under the age of 14 permitted.     *Please call [facility] Preadmission Testing Department for any further questions  [phone]      [facility] - MAIN ENTRANCE   [address] [place] [address], [facility]   [id number]        Email sent:  8/26/24

## 2024-08-27 ENCOUNTER — HOSPITAL ENCOUNTER (OUTPATIENT)
Dept: ULTRASOUND IMAGING | Age: 69
Discharge: HOME | End: 2024-08-27
Payer: COMMERCIAL

## 2024-08-27 DIAGNOSIS — I10 ESSENTIAL (PRIMARY) HYPERTENSION: ICD-10-CM

## 2024-08-27 DIAGNOSIS — R97.20 ELEVATED PROSTATE SPECIFIC ANTIGEN (PSA): ICD-10-CM

## 2024-08-27 DIAGNOSIS — N40.1 BENIGN PROSTATIC HYPERPLASIA WITH LOWER URINARY TRACT SYMPTOMS, SYMPTOM DETAILS UNSPECIFIED: ICD-10-CM

## 2024-08-27 DIAGNOSIS — R33.9 RETENTION OF URINE, UNSPECIFIED: ICD-10-CM

## 2024-08-27 DIAGNOSIS — Q62.11 CONGENITAL OCCLUSION OF URETEROPELVIC JUNCTION: ICD-10-CM

## 2024-08-27 DIAGNOSIS — N28.9 DISORDER OF KIDNEY AND URETER, UNSPECIFIED: ICD-10-CM

## 2024-08-27 DIAGNOSIS — R33.8 OTHER RETENTION OF URINE: ICD-10-CM

## 2024-08-27 DIAGNOSIS — R39.89 OTHER SYMPTOMS AND SIGNS INVOLVING THE GENITOURINARY SYSTEM: ICD-10-CM

## 2024-08-27 PROCEDURE — 76775 US EXAM ABDO BACK WALL LIM: CPT

## 2024-08-29 ENCOUNTER — ANESTHESIA EVENT (OUTPATIENT)
Dept: OPERATING ROOM | Age: 69
End: 2024-08-29
Payer: COMMERCIAL

## 2024-08-30 ENCOUNTER — ANESTHESIA (OUTPATIENT)
Dept: OPERATING ROOM | Age: 69
End: 2024-08-30
Payer: COMMERCIAL

## 2024-08-30 ENCOUNTER — APPOINTMENT (OUTPATIENT)
Dept: GENERAL RADIOLOGY | Age: 69
End: 2024-08-30
Attending: UROLOGY
Payer: COMMERCIAL

## 2024-08-30 ENCOUNTER — HOSPITAL ENCOUNTER (OUTPATIENT)
Age: 69
Setting detail: OUTPATIENT SURGERY
Discharge: HOME | End: 2024-08-30
Attending: UROLOGY | Admitting: UROLOGY
Payer: COMMERCIAL

## 2024-08-30 VITALS
RESPIRATION RATE: 14 BRPM | DIASTOLIC BLOOD PRESSURE: 77 MMHG | BODY MASS INDEX: 27.76 KG/M2 | SYSTOLIC BLOOD PRESSURE: 153 MMHG | HEART RATE: 84 BPM | OXYGEN SATURATION: 92 % | HEIGHT: 69 IN | TEMPERATURE: 97.5 F | WEIGHT: 187.39 LBS

## 2024-08-30 DIAGNOSIS — G89.18 POST-OP PAIN: Primary | ICD-10-CM

## 2024-08-30 LAB
ANION GAP SERPL CALCULATED.3IONS-SCNC: 14 MMOL/L (ref 3–16)
BUN SERPL-MCNC: 28 MG/DL (ref 7–20)
CALCIUM SERPL-MCNC: 10.1 MG/DL (ref 8.3–10.6)
CHLORIDE SERPL-SCNC: 99 MMOL/L (ref 99–110)
CO2 SERPL-SCNC: 24 MMOL/L (ref 21–32)
CREAT SERPL-MCNC: 6.2 MG/DL (ref 0.8–1.3)
GFR SERPLBLD CREATININE-BSD FMLA CKD-EPI: 9 ML/MIN/{1.73_M2}
GLUCOSE SERPL-MCNC: 101 MG/DL (ref 70–99)
POTASSIUM SERPL-SCNC: 4.3 MMOL/L (ref 3.5–5.1)
SODIUM SERPL-SCNC: 137 MMOL/L (ref 136–145)

## 2024-08-30 PROCEDURE — C1758 CATHETER, URETERAL: HCPCS | Performed by: UROLOGY

## 2024-08-30 PROCEDURE — 74018 RADEX ABDOMEN 1 VIEW: CPT

## 2024-08-30 PROCEDURE — 2709999900 HC NON-CHARGEABLE SUPPLY: Performed by: UROLOGY

## 2024-08-30 PROCEDURE — 7100000010 HC PHASE II RECOVERY - FIRST 15 MIN: Performed by: UROLOGY

## 2024-08-30 PROCEDURE — 7100000000 HC PACU RECOVERY - FIRST 15 MIN: Performed by: UROLOGY

## 2024-08-30 PROCEDURE — 7100000011 HC PHASE II RECOVERY - ADDTL 15 MIN: Performed by: UROLOGY

## 2024-08-30 PROCEDURE — 3600000004 HC SURGERY LEVEL 4 BASE: Performed by: UROLOGY

## 2024-08-30 PROCEDURE — 2580000003 HC RX 258: Performed by: UROLOGY

## 2024-08-30 PROCEDURE — 3600000014 HC SURGERY LEVEL 4 ADDTL 15MIN: Performed by: UROLOGY

## 2024-08-30 PROCEDURE — 6360000002 HC RX W HCPCS: Performed by: NURSE ANESTHETIST, CERTIFIED REGISTERED

## 2024-08-30 PROCEDURE — 7100000001 HC PACU RECOVERY - ADDTL 15 MIN: Performed by: UROLOGY

## 2024-08-30 PROCEDURE — 6370000000 HC RX 637 (ALT 250 FOR IP): Performed by: STUDENT IN AN ORGANIZED HEALTH CARE EDUCATION/TRAINING PROGRAM

## 2024-08-30 PROCEDURE — 80048 BASIC METABOLIC PNL TOTAL CA: CPT

## 2024-08-30 PROCEDURE — 6360000004 HC RX CONTRAST MEDICATION: Performed by: UROLOGY

## 2024-08-30 PROCEDURE — 6360000002 HC RX W HCPCS: Performed by: UROLOGY

## 2024-08-30 PROCEDURE — 3700000000 HC ANESTHESIA ATTENDED CARE: Performed by: UROLOGY

## 2024-08-30 PROCEDURE — 2500000003 HC RX 250 WO HCPCS: Performed by: NURSE ANESTHETIST, CERTIFIED REGISTERED

## 2024-08-30 PROCEDURE — 3700000001 HC ADD 15 MINUTES (ANESTHESIA): Performed by: UROLOGY

## 2024-08-30 PROCEDURE — 6360000002 HC RX W HCPCS: Performed by: STUDENT IN AN ORGANIZED HEALTH CARE EDUCATION/TRAINING PROGRAM

## 2024-08-30 RX ORDER — ONDANSETRON 2 MG/ML
INJECTION INTRAMUSCULAR; INTRAVENOUS PRN
Status: DISCONTINUED | OUTPATIENT
Start: 2024-08-30 | End: 2024-08-30 | Stop reason: SDUPTHER

## 2024-08-30 RX ORDER — SODIUM CHLORIDE 0.9 % (FLUSH) 0.9 %
5-40 SYRINGE (ML) INJECTION EVERY 12 HOURS SCHEDULED
Status: DISCONTINUED | OUTPATIENT
Start: 2024-08-30 | End: 2024-08-30 | Stop reason: HOSPADM

## 2024-08-30 RX ORDER — NALOXONE HYDROCHLORIDE 0.4 MG/ML
INJECTION, SOLUTION INTRAMUSCULAR; INTRAVENOUS; SUBCUTANEOUS PRN
Status: DISCONTINUED | OUTPATIENT
Start: 2024-08-30 | End: 2024-08-30 | Stop reason: HOSPADM

## 2024-08-30 RX ORDER — LABETALOL HYDROCHLORIDE 5 MG/ML
5 INJECTION, SOLUTION INTRAVENOUS EVERY 10 MIN PRN
Status: DISCONTINUED | OUTPATIENT
Start: 2024-08-30 | End: 2024-08-30 | Stop reason: HOSPADM

## 2024-08-30 RX ORDER — OXYCODONE HYDROCHLORIDE 5 MG/1
10 TABLET ORAL PRN
Status: COMPLETED | OUTPATIENT
Start: 2024-08-30 | End: 2024-08-30

## 2024-08-30 RX ORDER — LIDOCAINE HYDROCHLORIDE 10 MG/ML
1 INJECTION, SOLUTION EPIDURAL; INFILTRATION; INTRACAUDAL; PERINEURAL
Status: DISCONTINUED | OUTPATIENT
Start: 2024-08-30 | End: 2024-08-30 | Stop reason: HOSPADM

## 2024-08-30 RX ORDER — CEFAZOLIN SODIUM IN 0.9 % NACL 2 G/100 ML
2000 PLASTIC BAG, INJECTION (ML) INTRAVENOUS
Status: COMPLETED | OUTPATIENT
Start: 2024-08-30 | End: 2024-08-30

## 2024-08-30 RX ORDER — SODIUM CHLORIDE 9 MG/ML
INJECTION, SOLUTION INTRAVENOUS PRN
Status: DISCONTINUED | OUTPATIENT
Start: 2024-08-30 | End: 2024-08-30 | Stop reason: HOSPADM

## 2024-08-30 RX ORDER — CIPROFLOXACIN 500 MG/1
500 TABLET, FILM COATED ORAL 2 TIMES DAILY
Qty: 6 TABLET | Refills: 0 | Status: SHIPPED | OUTPATIENT
Start: 2024-08-30 | End: 2024-09-02

## 2024-08-30 RX ORDER — OXYCODONE HYDROCHLORIDE 5 MG/1
5 TABLET ORAL PRN
Status: COMPLETED | OUTPATIENT
Start: 2024-08-30 | End: 2024-08-30

## 2024-08-30 RX ORDER — PROPOFOL 10 MG/ML
INJECTION, EMULSION INTRAVENOUS PRN
Status: DISCONTINUED | OUTPATIENT
Start: 2024-08-30 | End: 2024-08-30 | Stop reason: SDUPTHER

## 2024-08-30 RX ORDER — MIDAZOLAM HYDROCHLORIDE 1 MG/ML
2 INJECTION INTRAMUSCULAR; INTRAVENOUS
Status: DISCONTINUED | OUTPATIENT
Start: 2024-08-30 | End: 2024-08-30 | Stop reason: HOSPADM

## 2024-08-30 RX ORDER — TRAMADOL HYDROCHLORIDE 50 MG/1
50 TABLET ORAL EVERY 6 HOURS PRN
Qty: 12 TABLET | Refills: 0 | Status: SHIPPED | OUTPATIENT
Start: 2024-08-30 | End: 2024-09-02

## 2024-08-30 RX ORDER — IOPAMIDOL 612 MG/ML
INJECTION, SOLUTION INTRAVASCULAR PRN
Status: DISCONTINUED | OUTPATIENT
Start: 2024-08-30 | End: 2024-08-30 | Stop reason: ALTCHOICE

## 2024-08-30 RX ORDER — LIDOCAINE HYDROCHLORIDE 20 MG/ML
INJECTION, SOLUTION EPIDURAL; INFILTRATION; INTRACAUDAL; PERINEURAL PRN
Status: DISCONTINUED | OUTPATIENT
Start: 2024-08-30 | End: 2024-08-30 | Stop reason: SDUPTHER

## 2024-08-30 RX ORDER — ONDANSETRON 2 MG/ML
4 INJECTION INTRAMUSCULAR; INTRAVENOUS
Status: DISCONTINUED | OUTPATIENT
Start: 2024-08-30 | End: 2024-08-30 | Stop reason: HOSPADM

## 2024-08-30 RX ORDER — FENTANYL CITRATE 50 UG/ML
INJECTION, SOLUTION INTRAMUSCULAR; INTRAVENOUS PRN
Status: DISCONTINUED | OUTPATIENT
Start: 2024-08-30 | End: 2024-08-30 | Stop reason: SDUPTHER

## 2024-08-30 RX ORDER — DIPHENHYDRAMINE HYDROCHLORIDE 50 MG/ML
12.5 INJECTION INTRAMUSCULAR; INTRAVENOUS
Status: DISCONTINUED | OUTPATIENT
Start: 2024-08-30 | End: 2024-08-30 | Stop reason: HOSPADM

## 2024-08-30 RX ORDER — SODIUM CHLORIDE, SODIUM LACTATE, POTASSIUM CHLORIDE, CALCIUM CHLORIDE 600; 310; 30; 20 MG/100ML; MG/100ML; MG/100ML; MG/100ML
INJECTION, SOLUTION INTRAVENOUS CONTINUOUS
Status: DISCONTINUED | OUTPATIENT
Start: 2024-08-30 | End: 2024-08-30 | Stop reason: HOSPADM

## 2024-08-30 RX ORDER — SODIUM CHLORIDE 0.9 % (FLUSH) 0.9 %
5-40 SYRINGE (ML) INJECTION PRN
Status: DISCONTINUED | OUTPATIENT
Start: 2024-08-30 | End: 2024-08-30 | Stop reason: HOSPADM

## 2024-08-30 RX ORDER — MIDAZOLAM HYDROCHLORIDE 1 MG/ML
INJECTION INTRAMUSCULAR; INTRAVENOUS PRN
Status: DISCONTINUED | OUTPATIENT
Start: 2024-08-30 | End: 2024-08-30 | Stop reason: SDUPTHER

## 2024-08-30 RX ADMIN — ONDANSETRON 4 MG: 2 INJECTION INTRAMUSCULAR; INTRAVENOUS at 07:51

## 2024-08-30 RX ADMIN — FENTANYL CITRATE 50 MCG: 50 INJECTION, SOLUTION INTRAMUSCULAR; INTRAVENOUS at 07:44

## 2024-08-30 RX ADMIN — FENTANYL CITRATE 50 MCG: 50 INJECTION, SOLUTION INTRAMUSCULAR; INTRAVENOUS at 07:25

## 2024-08-30 RX ADMIN — HYDROMORPHONE HYDROCHLORIDE 0.25 MG: 1 INJECTION, SOLUTION INTRAMUSCULAR; INTRAVENOUS; SUBCUTANEOUS at 08:26

## 2024-08-30 RX ADMIN — LIDOCAINE HYDROCHLORIDE 3 ML: 20 INJECTION, SOLUTION EPIDURAL; INFILTRATION; INTRACAUDAL; PERINEURAL at 07:26

## 2024-08-30 RX ADMIN — MIDAZOLAM 2 MG: 1 INJECTION INTRAMUSCULAR; INTRAVENOUS at 07:25

## 2024-08-30 RX ADMIN — OXYCODONE HYDROCHLORIDE 5 MG: 5 TABLET ORAL at 08:38

## 2024-08-30 RX ADMIN — Medication 2000 MG: at 07:31

## 2024-08-30 RX ADMIN — PROPOFOL 150 MG: 10 INJECTION, EMULSION INTRAVENOUS at 07:26

## 2024-08-30 ASSESSMENT — PAIN DESCRIPTION - DESCRIPTORS
DESCRIPTORS: DISCOMFORT;TENDER
DESCRIPTORS: TENDER

## 2024-08-30 ASSESSMENT — PAIN SCALES - GENERAL
PAINLEVEL_OUTOF10: 4
PAINLEVEL_OUTOF10: 5
PAINLEVEL_OUTOF10: 0

## 2024-08-30 ASSESSMENT — PAIN DESCRIPTION - LOCATION
LOCATION: GROIN
LOCATION: GROIN

## 2024-08-30 ASSESSMENT — PAIN DESCRIPTION - ORIENTATION
ORIENTATION: RIGHT
ORIENTATION: RIGHT

## 2024-08-30 ASSESSMENT — PAIN - FUNCTIONAL ASSESSMENT: PAIN_FUNCTIONAL_ASSESSMENT: 0-10

## 2024-08-30 NOTE — DISCHARGE INSTRUCTIONS
[facility]      Cystoscopy Discharge Instructions    Routine SupraPubic tube care, may remove dressing in 2 days.    You may experience : Burning sensation when you void     A feeling of a need to go to the bathroom frequently     Your urine may be blood tinged    These symptoms should be relieved within a few hours as you increase the amounts of fluids you drink and the number of times that you empty your bladder.  We recommended that you drink plenty of fluid a few days after surgery.    No strenuous activities until you talk to your doctor.    You may feel light headed up to 24 hours after anesthesia.  You should not do the following for the next 24 hours.       Drive a car, operate machinery or power tools     Drink any alcoholic drinks (not even beer or wine)     Make any important decisions, i.e., signing important papers.    It is recommended that you begin with clear liquids and/or light foods.  If you  Are not nauseated, progress to your normal diet.    I you are unable to urinate you should call your surgeon.    Dr. Anderson

## 2024-08-30 NOTE — ANESTHESIA POSTPROCEDURE EVALUATION
Department of Anesthesiology  Postprocedure Note    Patient: Milan Peres  MRN: [id number]  YOB: 1955  Date of evaluation: 8/30/2024    Procedure Summary       Date: 08/30/24 Room / Location: [address]    Anesthesia Start: 0723 Anesthesia Stop: 0803    Procedure: CYSTOSCOPY, RIGHT RETROGRADE, RIGHT  STENT REMOVAL AND SUPRAPUBIC CATHETER PLACEMENT (Right: Bladder) Diagnosis:       Acute retention of urine      (Acute retention of urine [R33.8])    Surgeons: Daren Anderson MD Responsible Provider: Hai Crowell MD    Anesthesia Type: general ASA Status: 3            Anesthesia Type: No value filed.    Karen Phase I: Karen Score: 10    Karen Phase II: Karen Score: 10    Anesthesia Post Evaluation    Comments: Postoperative Anesthesia Note    Name:    Milan Peres  MRN:      [id number]    Patient Vitals in the past 12 hrs:  08/30/24 0900, Pulse:84, SpO2:92 %  08/30/24 0855, BP:(!) 153/77, Pulse:83, SpO2:94 %  08/30/24 0850, BP:124/81, Pulse:85, SpO2:95 %  08/30/24 0845, BP:130/86, Pulse:92, SpO2:91 %  08/30/24 0840, BP:(!) 141/78, Pulse:86, SpO2:93 %  08/30/24 0835, BP:129/73, Pulse:90, SpO2:92 %  08/30/24 0830, BP:121/82, Pulse:90, SpO2:92 %  08/30/24 0825, BP:129/76, Temp:97.5 °F (36.4 °C), Temp src:Temporal, Pulse:90, Resp:14, SpO2:92 %  08/30/24 0820, BP:136/82, Pulse:93, SpO2:91 %  08/30/24 0815, BP:125/85, Pulse:93, Resp:12, SpO2:92 %  08/30/24 0810, BP:131/83, Pulse:93, Resp:10, SpO2:93 %  08/30/24 0805, BP:120/80, Pulse:96, Resp:(!) 6, SpO2:93 %  08/30/24 0800, BP:118/76, Temp:97.8 °F (36.6 °C), Temp src:Temporal, Pulse:95, Resp:(!) 9, SpO2:94 %  08/30/24 0625, BP:133/79, Temp:97.5 °F (36.4 °C), Temp src:Oral, Pulse:98, Resp:16, SpO2:97 %, Height:1.753 m (5' 9\"), Weight:85 kg (187 lb 6.3 oz)     LABS:    CBC  Lab Results       Component                Value               Date/Time                  WBC                      7.7                 06/05/2024 10:19  AM        HGB                      13.5                06/05/2024 10:19 AM        HCT                      39.6 (L)            06/05/2024 10:19 AM        PLT                      264                 06/05/2024 10:19 AM   RENAL  Lab Results       Component                Value               Date/Time                  NA                       137                 08/30/2024 06:43 AM        K                        4.3                 08/30/2024 06:43 AM        K                        4.1                 02/24/2024 06:46 AM        CL                       99                  08/30/2024 06:43 AM        CO2                      24                  08/30/2024 06:43 AM        BUN                      28 (H)              08/30/2024 06:43 AM        CREATININE               6.2 (HH)            08/30/2024 06:43 AM        GLUCOSE                  101 (H)             08/30/2024 06:43 AM   COAGS  Lab Results       Component                Value               Date/Time                  PROTIME                  13.5                06/05/2024 10:19 AM        INR                      1.01                06/05/2024 10:19 AM        APTT                     29.0                06/05/2024 10:19 AM     Nausea & Vomiting:  No    Level of Consciousness:  Awake    Pain Assessment:  Adequate analgesia    Anesthesia Complications:  No apparent anesthetic complications    SUMMARY      Vital signs stable  OK to discharge from Stage I post anesthesia care.  Care transferred from Anesthesiology department on discharge from perioperative area       No notable events documented.

## 2024-08-30 NOTE — PROGRESS NOTES
Discharge instructions given to pt and family. Verbalized understanding. PIV removed. Pt dressed and wheeled out and discharged to the care of their family in stable condition. Patient demonstrated emptying of catheter bag with RN.

## 2024-08-30 NOTE — BRIEF OP NOTE
Brief Postoperative Note      Patient: Milan Peres  YOB: 1955  MRN: [id number]    Date of Procedure: 8/30/2024    Pre-Op Diagnosis Codes:      * Acute retention of urine [R33.8], retained right ureteral stent    Post-Op Diagnosis: Same       Procedure(s):  CYSTOSCOPY, RIGHT RETROGRADE, RIGHT  STENT REMOVAL AND SUPRAPUBIC CATHETER PLACEMENT    Surgeon(s):  Daren Anderson MD    Assistant:  Surgical Assistant: Mazin Mcgraw    Anesthesia: General    Estimated Blood Loss (mL): Minimal    Complications: None    Specimens:   * No specimens in log *    Implants:  * No implants in log *      Drains:   Suprapubic Catheter Double-lumen (Active)       Findings:  Infection Present At Time Of Surgery (PATOS) (choose all levels that have infection present):  No infection present  Other Findings: contrast efflux from right ureter    Electronically signed by Daren Anderson MD on 8/30/2024 at 7:58 AM

## 2024-08-30 NOTE — PROGRESS NOTES
Pt brought to PACU. Report obtained from OR RN and anesthesia. Pt placed on monitor NSR and O2 at 94% on RA. Oral airway in place.

## 2024-08-30 NOTE — ANESTHESIA PRE PROCEDURE
Department of Anesthesiology  Preprocedure Note       Name:  Milan Peres   Age:  69 y.o.  :  1955                                          MRN:  [id number]         Date:  2024      Surgeon: Surgeon(s):  Daren Anderson MD    Procedure: Procedure(s):  CYSTOSCOPY, RIGHT RETROGRADE, RIGHT  STENT REMOVAL AND SUPRAPUBIC CATHETER PLACEMENT    Medications prior to admission:   Prior to Admission medications    Medication Sig Start Date End Date Taking? Authorizing Provider   sevelamer (RENVELA) 800 MG tablet Take 1 tablet by mouth 3 times daily (with meals)   Yes Velma Meza MD   umeclidinium-vilanterol (ANORO ELLIPTA) 62.5-25 MCG/ACT inhaler Inhale 1 puff into the lungs daily 24  Yes Abilio Morgan Jr., MD   albuterol sulfate HFA (VENTOLIN HFA) 108 (90 Base) MCG/ACT inhaler Inhale 2 puffs into the lungs 4 times daily as needed for Wheezing 24  Yes Abilio Morgan Jr., MD   B Complex-C-Folic Acid (RENAL-SILVIA PO) Take 1 tablet by mouth every morning   Yes ProviderVelma MD   atorvastatin (LIPITOR) 20 MG tablet Take 1 tablet by mouth daily  Patient taking differently: Take 1 tablet by mouth at bedtime 24  Yes Abilio Morgan Jr., MD   carvedilol (COREG) 6.25 MG tablet Take 1 tablet by mouth 2 times daily Take AM 24 surgery Day 9/15/23  Yes ProviderVelma MD   NIFEdipine (ADALAT CC) 90 MG extended release tablet Take 1 tablet by mouth at bedtime 23  Yes ProviderVelma MD   tamsulosin (FLOMAX) 0.4 MG capsule Take 1 capsule by mouth every evening 23  Yes ProviderVelma MD       Current medications:    Current Facility-Administered Medications   Medication Dose Route Frequency Provider Last Rate Last Admin    ceFAZolin (ANCEF) 2000 mg in 0.9% sodium chloride 100 mL IVPB  2,000 mg IntraVENous On Call to OR Daren Anderson MD        lidocaine PF 1 % injection 1 mL  1 mL IntraDERmal Once PRN Tai Wu MD        lactated ringers IV soln infusion    Never                                Counseling given: Not Answered      Vital Signs (Current):   Vitals:    08/26/24 0920 08/30/24 0625   BP:  133/79   Pulse:  98   Resp:  16   Temp:  97.5 °F (36.4 °C)   TempSrc:  Oral   SpO2:  97%   Weight: 83.5 kg (184 lb) 85 kg (187 lb 6.3 oz)   Height: 1.753 m (5' 9\") 1.753 m (5' 9\")                                              BP Readings from Last 3 Encounters:   08/30/24 133/79   08/23/24 128/64   08/09/24 122/62       NPO Status: Time of last liquid consumption: 1900                        Time of last solid consumption: 1900                        Date of last liquid consumption: 08/29/24                        Date of last solid food consumption: 08/29/24    BMI:   Wt Readings from Last 3 Encounters:   08/30/24 85 kg (187 lb 6.3 oz)   08/23/24 83.7 kg (184 lb 9.6 oz)   08/09/24 82 kg (180 lb 12.8 oz)     Body mass index is 27.67 kg/m².    CBC:   Lab Results   Component Value Date/Time    WBC 7.7 06/05/2024 10:19 AM    RBC 4.03 06/05/2024 10:19 AM    HGB 13.5 06/05/2024 10:19 AM    HCT 39.6 06/05/2024 10:19 AM    MCV 98.1 06/05/2024 10:19 AM    RDW 16.0 06/05/2024 10:19 AM     06/05/2024 10:19 AM       CMP:   Lab Results   Component Value Date/Time     06/05/2024 10:19 AM    K 4.2 06/05/2024 10:19 AM    K 4.1 02/24/2024 06:46 AM    CL 96 06/05/2024 10:19 AM    CO2 25 06/05/2024 10:19 AM    BUN 35 06/05/2024 10:19 AM    CREATININE 6.7 06/05/2024 10:19 AM    AGRATIO 1.8 06/05/2024 10:19 AM    LABGLOM 8 06/05/2024 10:19 AM    LABGLOM 10 02/28/2024 08:09 AM    GLUCOSE 95 06/05/2024 10:19 AM    CALCIUM 9.9 06/05/2024 10:19 AM    BILITOT 0.3 06/05/2024 10:19 AM    ALKPHOS 69 06/05/2024 10:19 AM    AST 7 06/05/2024 10:19 AM    ALT 9 06/05/2024 10:19 AM       POC Tests: No results for input(s): \"POCGLU\", \"POCNA\", \"POCK\", \"POCCL\", \"POCBUN\", \"POCHEMO\", \"POCHCT\" in the last 72 hours.    Coags:   Lab Results   Component Value Date/Time    PROTIME 13.5 06/05/2024 10:19 AM

## 2024-08-30 NOTE — OP NOTE
[address] [address]                            OPERATIVE REPORT      PATIENT NAME: NEREIDA SOUSA              : 1955  MED REC NO: [id number]                      ROOM: [facility]  ACCOUNT NO: [id number]                       ADMIT DATE: 2024  PROVIDER: Daren Anderson MD      DATE OF PROCEDURE:  2024    SURGEON:  Daren Anderson MD    PREOPERATIVE DIAGNOSES:  Urinary retention and retained right double-J ureteral stent.    POSTOPERATIVE DIAGNOSES:  Urinary retention and retained right double-J ureteral stent.    PROCEDURES PERFORMED:  Cystoscopy, right stent removal, right retrograde pyelogram, and suprapubic tube placement.    INDICATION FOR THE PROCEDURE:  The patient is a 69-year-old male with renal failure who had right ureteral obstruction.  He has continued to need dialysis and has urinary retention.  The risks and benefits of the above-named procedure were discussed with the patient and he agreed to proceed.    DESCRIPTION OF PROCEDURE:  The patient had preoperative antibiotics and general anesthesia.  He was in lithotomy position.  His genitalia and lower abdomen were prepped and draped in usual sterile fashion.  A 21-Stateless rigid cystoscope inserted into the patient's urethra.  I looked into the patient's bladder.  He had signs of a TURP.  I found the right stent, took graspers, pulled it out, placed the Crumpler 5-Stateless catheter in the right ureteric orifice, injected contrast and went up to a mildly dilated right kidney, but I saw contrast effluxing out of the right ureteric orifice; therefore, I did not replace a stent.  I then filled the bladder up and took a spinal needle through his abdomen just above the pubic bone and saw were entered into the dome.  I then used a 15 blade to make a 1 cm incision above the suprapubic bone in the abdomen.  I then removed the cystoscope and put a Lowsley retractor and cut

## 2024-09-02 DIAGNOSIS — J44.9 STAGE 2 MODERATE COPD BY GOLD CLASSIFICATION (HCC): ICD-10-CM

## 2024-09-02 RX ORDER — ALBUTEROL SULFATE 90 UG/1
2 AEROSOL, METERED RESPIRATORY (INHALATION) 4 TIMES DAILY PRN
Qty: 18 EACH | Refills: 3 | Status: SHIPPED | OUTPATIENT
Start: 2024-09-02

## 2024-10-17 DIAGNOSIS — J44.9 STAGE 2 MODERATE COPD BY GOLD CLASSIFICATION (HCC): ICD-10-CM

## 2024-10-17 RX ORDER — UMECLIDINIUM BROMIDE AND VILANTEROL TRIFENATATE 62.5; 25 UG/1; UG/1
1 POWDER RESPIRATORY (INHALATION) DAILY
Qty: 60 EACH | Refills: 0 | Status: SHIPPED | OUTPATIENT
Start: 2024-10-17

## 2024-10-17 NOTE — TELEPHONE ENCOUNTER
Last Office Visit  -  8/23/24  Next Office Visit  -  n/a    Last Filled  -  8/9/24  Last UDS -    Contract -

## 2024-12-04 DIAGNOSIS — J44.9 STAGE 2 MODERATE COPD BY GOLD CLASSIFICATION (HCC): ICD-10-CM

## 2024-12-04 RX ORDER — ALBUTEROL SULFATE 90 UG/1
2 INHALANT RESPIRATORY (INHALATION) 4 TIMES DAILY PRN
Qty: 18 EACH | Refills: 3 | Status: SHIPPED | OUTPATIENT
Start: 2024-12-04

## 2024-12-04 NOTE — TELEPHONE ENCOUNTER
Last Office Visit  -  8/23/24  Next Office Visit  -  n/a    Last Filled  -  9/2/24  Last UDS -    Contract -

## 2025-02-19 DIAGNOSIS — J44.9 STAGE 2 MODERATE COPD BY GOLD CLASSIFICATION (HCC): ICD-10-CM

## 2025-02-19 RX ORDER — UMECLIDINIUM BROMIDE AND VILANTEROL TRIFENATATE 62.5; 25 UG/1; UG/1
1 POWDER RESPIRATORY (INHALATION) DAILY
Qty: 60 EACH | Refills: 0 | Status: SHIPPED | OUTPATIENT
Start: 2025-02-19

## 2025-02-19 NOTE — TELEPHONE ENCOUNTER
Last Office Visit  -  8/23/24  Next Office Visit  -  n/a    Last Filled  -  10/17/24  Last UDS -    Contract -

## 2025-02-25 ENCOUNTER — HOSPITAL ENCOUNTER (EMERGENCY)
Age: 70
Discharge: HOME | End: 2025-02-25
Attending: EMERGENCY MEDICINE
Payer: COMMERCIAL

## 2025-02-25 VITALS
DIASTOLIC BLOOD PRESSURE: 94 MMHG | OXYGEN SATURATION: 96 % | TEMPERATURE: 98.7 F | BODY MASS INDEX: 28.21 KG/M2 | WEIGHT: 191 LBS | SYSTOLIC BLOOD PRESSURE: 170 MMHG | HEART RATE: 93 BPM | RESPIRATION RATE: 15 BRPM

## 2025-02-25 DIAGNOSIS — N39.0 URINARY TRACT INFECTION ASSOCIATED WITH CYSTOSTOMY CATHETER, INITIAL ENCOUNTER: ICD-10-CM

## 2025-02-25 DIAGNOSIS — T83.510A URINARY TRACT INFECTION ASSOCIATED WITH CYSTOSTOMY CATHETER, INITIAL ENCOUNTER: ICD-10-CM

## 2025-02-25 DIAGNOSIS — R33.9 URINARY RETENTION: Primary | ICD-10-CM

## 2025-02-25 LAB
BILIRUB UR QL STRIP.AUTO: NEGATIVE
CHARACTER UR: ABNORMAL
CLARITY UR: ABNORMAL
COLOR UR: YELLOW
GLUCOSE UR STRIP.AUTO-MCNC: NEGATIVE MG/DL
HGB UR QL STRIP.AUTO: ABNORMAL
KETONES UR STRIP.AUTO-MCNC: NEGATIVE MG/DL
LEUKOCYTE ESTERASE UR QL STRIP.AUTO: ABNORMAL
NITRITE UR QL STRIP.AUTO: NEGATIVE
PH UR STRIP.AUTO: 7.5 [PH] (ref 5–8)
PROT UR STRIP.AUTO-MCNC: 100 MG/DL
RBC #/AREA URNS HPF: ABNORMAL /HPF (ref 0–4)
SP GR UR STRIP.AUTO: 1.02 (ref 1–1.03)
UA COMPLETE W REFLEX CULTURE PNL UR: YES
UA DIPSTICK W REFLEX MICRO PNL UR: YES
URN SPEC COLLECT METH UR: ABNORMAL
UROBILINOGEN UR STRIP-ACNC: 0.2 E.U./DL
WBC #/AREA URNS HPF: >100 /HPF (ref 0–5)

## 2025-02-25 PROCEDURE — 81001 URINALYSIS AUTO W/SCOPE: CPT

## 2025-02-25 PROCEDURE — 6370000000 HC RX 637 (ALT 250 FOR IP): Performed by: EMERGENCY MEDICINE

## 2025-02-25 PROCEDURE — 87086 URINE CULTURE/COLONY COUNT: CPT

## 2025-02-25 PROCEDURE — 99283 EMERGENCY DEPT VISIT LOW MDM: CPT

## 2025-02-25 PROCEDURE — 51702 INSERT TEMP BLADDER CATH: CPT

## 2025-02-25 RX ORDER — TAMSULOSIN HYDROCHLORIDE 0.4 MG/1
0.4 CAPSULE ORAL DAILY
COMMUNITY
Start: 2024-10-28

## 2025-02-25 RX ORDER — CEPHALEXIN 500 MG/1
500 CAPSULE ORAL 3 TIMES DAILY
Qty: 21 CAPSULE | Refills: 0 | Status: SHIPPED | OUTPATIENT
Start: 2025-02-25 | End: 2025-03-04

## 2025-02-25 RX ADMIN — CEPHALEXIN 500 MG: 250 CAPSULE ORAL at 05:41

## 2025-02-25 ASSESSMENT — PAIN - FUNCTIONAL ASSESSMENT: PAIN_FUNCTIONAL_ASSESSMENT: 0-10

## 2025-02-25 ASSESSMENT — PAIN SCALES - GENERAL: PAINLEVEL_OUTOF10: 5

## 2025-02-25 NOTE — ED PROVIDER NOTES
respiratory distress.   Abdominal:      Palpations: Abdomen is soft.      Tenderness: There is no abdominal tenderness.      Comments: Mild suprapubic tenderness, suprapubic han catheter.    Musculoskeletal:      Cervical back: Neck supple.   Skin:     Coloration: Skin is not pale.   Neurological:      Mental Status: He is alert.   Psychiatric:         Mood and Affect: Mood normal.         Behavior: Behavior normal.           ALLERGIES:    Patient has no known allergies.    PAST MEDICAL HISTORY:    Past Medical History:   Diagnosis Date    Chronic indwelling Han catheter     COPD (chronic obstructive pulmonary disease) (Former[facility])     Dependence on renal dialysis     Tu,Thurs, Sat    ESRD (end stage renal disease) (Formerly [facility])     Former smoker     Hemodialysis patient     Herpes zoster with ophthalmic complication     Hip pain     History of blood transfusion     HTN (hypertension)     Hx of blood clots     Hydronephrosis     Hyperlipidemia     Port-A-Cath in place     right chest, removed    Shingles     resolved    Urinary retention     Vascular dialysis catheter in place     left       SURGICAL/SOCIAL HISTORIES and HOME MEDICATIONS:    Reviewed in EHR.     DIAGNOSTIC RESULTS:    LABS:   Labs Reviewed   URINALYSIS WITH REFLEX TO CULTURE - Abnormal; Notable for the following components:       Result Value    Clarity, UA SL CLOUDY (*)     Blood, Urine SMALL (*)     Protein,  (*)     Leukocyte Esterase, Urine MODERATE (*)     All other components within normal limits   MICROSCOPIC URINALYSIS - Abnormal; Notable for the following components:    WBC, UA >100 (*)     RBC, UA see below (*)     All other components within normal limits   CULTURE, URINE      When ordered only abnormal lab results are displayed. All other labs were within normal range or not returned as of this dictation.     EKG: Twelve-lead EKG as read and interpreted by myself in the absence of Cardiology is as follows:         RADIOLOGY:    Non-plain film

## 2025-02-26 LAB — BACTERIA UR CULT: NORMAL

## 2025-03-03 ENCOUNTER — HOSPITAL ENCOUNTER (OUTPATIENT)
Dept: CT IMAGING | Age: 70
Discharge: HOME | End: 2025-03-03
Attending: STUDENT IN AN ORGANIZED HEALTH CARE EDUCATION/TRAINING PROGRAM
Payer: COMMERCIAL

## 2025-03-03 ENCOUNTER — HOSPITAL ENCOUNTER (OUTPATIENT)
Age: 70
Discharge: HOME | End: 2025-03-03
Attending: STUDENT IN AN ORGANIZED HEALTH CARE EDUCATION/TRAINING PROGRAM
Payer: COMMERCIAL

## 2025-03-03 ENCOUNTER — HOSPITAL ENCOUNTER (OUTPATIENT)
Dept: GENERAL RADIOLOGY | Age: 70
Discharge: HOME | End: 2025-03-03
Attending: STUDENT IN AN ORGANIZED HEALTH CARE EDUCATION/TRAINING PROGRAM
Payer: COMMERCIAL

## 2025-03-03 DIAGNOSIS — Z87.891 PERSONAL HISTORY OF TOBACCO USE: ICD-10-CM

## 2025-03-03 DIAGNOSIS — M70.71 BURSITIS OF RIGHT HIP, UNSPECIFIED BURSA: ICD-10-CM

## 2025-03-03 DIAGNOSIS — M70.62 GREATER TROCHANTERIC BURSITIS OF LEFT HIP: ICD-10-CM

## 2025-03-03 PROCEDURE — 71271 CT THORAX LUNG CANCER SCR C-: CPT

## 2025-03-03 PROCEDURE — 73521 X-RAY EXAM HIPS BI 2 VIEWS: CPT

## 2025-03-07 DIAGNOSIS — Z00.00 ENCOUNTER FOR SCREENING AND PREVENTATIVE CARE: ICD-10-CM

## 2025-03-07 NOTE — TELEPHONE ENCOUNTER
Last Office Visit  -  08/23/2024  Next Office Visit  -  n/a    Last Filled  -    Last UDS -    Contract -    
.

## 2025-03-08 RX ORDER — ATORVASTATIN CALCIUM 20 MG/1
20 TABLET, FILM COATED ORAL NIGHTLY
Qty: 90 TABLET | Refills: 3 | Status: SHIPPED | OUTPATIENT
Start: 2025-03-08

## 2025-03-21 DIAGNOSIS — J44.9 STAGE 2 MODERATE COPD BY GOLD CLASSIFICATION (HCC): ICD-10-CM

## 2025-03-24 RX ORDER — UMECLIDINIUM BROMIDE AND VILANTEROL TRIFENATATE 62.5; 25 UG/1; UG/1
1 POWDER RESPIRATORY (INHALATION) DAILY
Qty: 60 EACH | Refills: 0 | Status: SHIPPED | OUTPATIENT
Start: 2025-03-24

## 2025-06-30 ENCOUNTER — HOSPITAL ENCOUNTER (EMERGENCY)
Age: 70
Discharge: HOME | End: 2025-06-30
Attending: EMERGENCY MEDICINE
Payer: COMMERCIAL

## 2025-06-30 VITALS
BODY MASS INDEX: 26.87 KG/M2 | HEIGHT: 69 IN | DIASTOLIC BLOOD PRESSURE: 94 MMHG | SYSTOLIC BLOOD PRESSURE: 187 MMHG | RESPIRATION RATE: 16 BRPM | OXYGEN SATURATION: 99 % | HEART RATE: 90 BPM | TEMPERATURE: 98.1 F | WEIGHT: 181.4 LBS

## 2025-06-30 DIAGNOSIS — T83.510A URINARY TRACT INFECTION ASSOCIATED WITH CYSTOSTOMY CATHETER, INITIAL ENCOUNTER: Primary | ICD-10-CM

## 2025-06-30 DIAGNOSIS — N39.0 URINARY TRACT INFECTION ASSOCIATED WITH CYSTOSTOMY CATHETER, INITIAL ENCOUNTER: Primary | ICD-10-CM

## 2025-06-30 LAB
BACTERIA URNS QL MICRO: ABNORMAL /HPF
BILIRUB UR QL STRIP.AUTO: NEGATIVE
CLARITY UR: ABNORMAL
COLOR UR: YELLOW
GLUCOSE UR STRIP.AUTO-MCNC: 100 MG/DL
HGB UR QL STRIP.AUTO: ABNORMAL
KETONES UR STRIP.AUTO-MCNC: NEGATIVE MG/DL
LEUKOCYTE ESTERASE UR QL STRIP.AUTO: ABNORMAL
NITRITE UR QL STRIP.AUTO: NEGATIVE
PH UR STRIP.AUTO: 7.5 [PH] (ref 5–8)
PROT UR STRIP.AUTO-MCNC: 100 MG/DL
RBC #/AREA URNS HPF: >100 /HPF (ref 0–4)
SP GR UR STRIP.AUTO: 1.01 (ref 1–1.03)
UA COMPLETE W REFLEX CULTURE PNL UR: YES
UA DIPSTICK W REFLEX MICRO PNL UR: YES
URN SPEC COLLECT METH UR: ABNORMAL
UROBILINOGEN UR STRIP-ACNC: 0.2 E.U./DL
WBC #/AREA URNS HPF: >100 /HPF (ref 0–5)

## 2025-06-30 PROCEDURE — 87086 URINE CULTURE/COLONY COUNT: CPT

## 2025-06-30 PROCEDURE — 81001 URINALYSIS AUTO W/SCOPE: CPT

## 2025-06-30 PROCEDURE — 51702 INSERT TEMP BLADDER CATH: CPT

## 2025-06-30 PROCEDURE — 99283 EMERGENCY DEPT VISIT LOW MDM: CPT

## 2025-06-30 PROCEDURE — 6370000000 HC RX 637 (ALT 250 FOR IP): Performed by: EMERGENCY MEDICINE

## 2025-06-30 RX ORDER — LIDOCAINE HYDROCHLORIDE 20 MG/ML
JELLY TOPICAL PRN
Status: DISCONTINUED | OUTPATIENT
Start: 2025-06-30 | End: 2025-06-30 | Stop reason: HOSPADM

## 2025-06-30 RX ORDER — CEFDINIR 300 MG/1
300 CAPSULE ORAL 2 TIMES DAILY
Qty: 14 CAPSULE | Refills: 0 | Status: SHIPPED | OUTPATIENT
Start: 2025-06-30 | End: 2025-07-07

## 2025-06-30 RX ORDER — CEFDINIR 300 MG/1
300 CAPSULE ORAL ONCE
Status: COMPLETED | OUTPATIENT
Start: 2025-06-30 | End: 2025-06-30

## 2025-06-30 RX ADMIN — LIDOCAINE HYDROCHLORIDE: 20 JELLY TOPICAL at 13:20

## 2025-06-30 RX ADMIN — CEFDINIR 300 MG: 300 CAPSULE ORAL at 14:46

## 2025-06-30 ASSESSMENT — PAIN - FUNCTIONAL ASSESSMENT: PAIN_FUNCTIONAL_ASSESSMENT: NONE - DENIES PAIN

## 2025-06-30 ASSESSMENT — PAIN SCALES - GENERAL: PAINLEVEL_OUTOF10: 0

## 2025-06-30 NOTE — ED NOTES
Pt's suprapubic catheter replaced by Kamron SUAZO and Donavon REY. Pt's suprapubic catheter is 16F with balloon 7cc.

## 2025-06-30 NOTE — ED PROVIDER NOTES
I independently examined and evaluated Milan Peres.    In brief, patient is a 69yoM who presents to the ED for evaluation of suprapubic cathter falling out. Reports this occurred around 10am. He tried to get it back in but he noticed blood and was unsuccessful. It was successfully replaced by Mazin Avilez. I was present for the entire duration of the procedure. He had normal urine output. He was given cefdinir. Discharged home with instructions to follow up with urology.     All diagnostic, treatment, and disposition decisions were made by myself in conjunction with the advanced practice provider/resident physician.     I personally saw the patient and performed a substantive portion of the visit including aspects of the medical decision making. I approved management plan and take responsibility for the patient management.     I personally saw the patient and independently provided 0 minutes of non-concurrent critical care out of the total shared critical care time provided.    Comment: Please note this report has been produced using speech recognition software and may contain errors related to that system including errors in grammar, punctuation, and spelling, as well as words and phrases that may be inappropriate. If there are any questions or concerns please feel free to contact the dictating provider for clarification.    For all further details of the patient's emergency department visit, please see the advanced practice provider's documentation.        Katina Raphael MD  07/01/25 [id number]

## 2025-06-30 NOTE — ED PROVIDER NOTES
[facility] EMERGENCY DEPARTMENT  Emergency Department Encounter    Patient Name: Milan Peres  MRN: [id number]  YOB: 1955  Date of Evaluation: 6/30/2025  Provider: Abilio Morgan Jr., MD  Note Started: 1:18 PM EDT 6/30/25    CHIEF COMPLAINT  Urinary Catheter (Pt states that suprapubic catheter fell out this AM.  Denies pain, states he went to use RR and noticed the balloon was out.  Pt is a dialysis pt and has catheter d/t retention)    SHARED SERVICE VISIT   I have seen and evaluated this patient with my supervising physician, Dr. Frazier.    HISTORY OF PRESENT ILLNESS  History From: Patient    Limitations to history : None    Milan Peres is a 69 y.o. male with history of ESRD on dialysis Tuesday, Thursday, and Saturday who presents to the ED for evaluation of suprapubic catheter falling out.  Patient states that he does have a history of ESRD however still makes a substantial amount of urine.  States that this morning at around 10 AM his suprapubic catheter fell out.  Patient states that the balloon was deflated.  Patient states that he attempted to replace the catheter himself without success.  States that he spoke with his urologist office who could not get the patient into be seen and was then advised to come to the ED for further management.  Patient states that he is feeling otherwise well.  Denies any fevers or chills.  Denies any neck pain or back pain.  Denies any shortness of breath or chest pain.    No other complaints, modifying factors or associated symptoms.     Nursing notes reviewed were all reviewed and agreed with or any disagreements were addressed in the HPI.    PMH:  Past Medical History:   Diagnosis Date    Chronic indwelling Moss catheter     COPD (chronic obstructive pulmonary disease) ([facility])     Dependence on renal dialysis     Tu,Thurs, Sat    ESRD (end stage renal disease) ([facility])     Former smoker     Hemodialysis patient     Herpes zoster with ophthalmic

## 2025-07-01 ENCOUNTER — HOSPITAL ENCOUNTER (EMERGENCY)
Age: 70
Discharge: HOME | End: 2025-07-01
Attending: STUDENT IN AN ORGANIZED HEALTH CARE EDUCATION/TRAINING PROGRAM
Payer: COMMERCIAL

## 2025-07-01 ENCOUNTER — RESULTS FOLLOW-UP (OUTPATIENT)
Dept: EMERGENCY DEPT | Age: 70
End: 2025-07-01

## 2025-07-01 VITALS
OXYGEN SATURATION: 99 % | WEIGHT: 181.8 LBS | HEART RATE: 93 BPM | SYSTOLIC BLOOD PRESSURE: 188 MMHG | TEMPERATURE: 98.4 F | DIASTOLIC BLOOD PRESSURE: 103 MMHG | RESPIRATION RATE: 16 BRPM | BODY MASS INDEX: 26.85 KG/M2

## 2025-07-01 DIAGNOSIS — T83.010A SUPRAPUBIC CATHETER DYSFUNCTION, INITIAL ENCOUNTER: Primary | ICD-10-CM

## 2025-07-01 LAB — BACTERIA UR CULT: NORMAL

## 2025-07-01 PROCEDURE — 51705 CHANGE OF BLADDER TUBE: CPT

## 2025-07-01 PROCEDURE — 99282 EMERGENCY DEPT VISIT SF MDM: CPT

## 2025-07-01 NOTE — ED PROVIDER NOTES
[facility] EMERGENCY DEPARTMENT      EMERGENCY MEDICINE     Pt Name: Milan Peres  MRN: [id number]  Birthdate 1955  Date of evaluation: 7/1/2025  Provider: Jacobo Marie MD    CHIEF COMPLAINT     Suprapubic catheter dysfunction    HISTORY OF PRESENT ILLNESS   Milan Peres is a 69 y.o. male who presents to the emergency department for suprapubic catheter accidentally falling out.  No other complaints or injuries.  16 Mongolian was placed yesterday.    Patient was seen yesterday in the ER to have their suprapubic catheter exchanged      PASTMEDICAL HISTORY     Past Medical History:   Diagnosis Date    Chronic indwelling Moss catheter     COPD (chronic obstructive pulmonary disease) (Former[facility])     Dependence on renal dialysis     Tu,Thurs, Sat    ESRD (end stage renal disease) (Former[facility])     Former smoker     Hemodialysis patient     Herpes zoster with ophthalmic complication     Hip pain     History of blood transfusion     HTN (hypertension)     Hx of blood clots     Hydronephrosis     Hyperlipidemia     Port-A-Cath in place     right chest, removed    Shingles     resolved    Urinary retention     Vascular dialysis catheter in place     left       Patient Active Problem List   Diagnosis Code    Encounter regarding vascular access for dialysis for ESRD (HCC) N18.6, Z99.2     SURGICAL HISTORY       Past Surgical History:   Procedure Laterality Date    COLONOSCOPY      CYSTOSCOPY Right 8/30/2024    CYSTOSCOPY, RIGHT RETROGRADE, RIGHT  STENT REMOVAL AND SUPRAPUBIC CATHETER PLACEMENT performed by Daren Anderson MD at [facility] OR    DIALYSIS CATHETER INSERTION      DIALYSIS FISTULA CREATION Left 02/28/2024    LEFT RADIAL CEPHALIC ARTERIAL VENOUS  FISTULA CREATION performed by Simon Hauser MD at [facility] OR    PORT SURGERY      TURP N/A 6/21/2024    CYSTOSCOPY, TRANSURETHRAL RESECTION OF THE  PROSTATE performed by Daren Anderson MD at [facility] OR       CURRENT MEDICATIONS       Discharge Medication List as of

## 2025-07-01 NOTE — DISCHARGE INSTRUCTIONS
Follow-up with your family doctor as needed or urologist as needed    You may continue taking your home medications as they are prescribed.    If you have been prescribed medications please make sure to monitor how to take them accordingly    Return if develop any new or worsening symptoms    THANK YOU for allowing me and the rest of the Emergency Department staff at [facility] to care for you today. We hope that your care has been nothing short of EXCELLENT today. In the near future you may receive a survey in the mail about your visit. Please fill this survey out and return it so that we can continue to provide you with EXCELLENT care.

## 2025-07-20 DIAGNOSIS — J44.9 STAGE 2 MODERATE COPD BY GOLD CLASSIFICATION (HCC): ICD-10-CM

## 2025-07-21 RX ORDER — UMECLIDINIUM BROMIDE AND VILANTEROL TRIFENATATE 62.5; 25 UG/1; UG/1
1 POWDER RESPIRATORY (INHALATION) DAILY
Qty: 60 EACH | Refills: 0 | Status: SHIPPED | OUTPATIENT
Start: 2025-07-21

## 2025-08-24 DIAGNOSIS — J44.9 STAGE 2 MODERATE COPD BY GOLD CLASSIFICATION (HCC): ICD-10-CM

## 2025-08-24 RX ORDER — ALBUTEROL SULFATE 90 UG/1
2 INHALANT RESPIRATORY (INHALATION) 4 TIMES DAILY PRN
Qty: 18 EACH | Refills: 3 | Status: SHIPPED | OUTPATIENT
Start: 2025-08-24

## 2025-08-26 DIAGNOSIS — J44.9 STAGE 2 MODERATE COPD BY GOLD CLASSIFICATION (HCC): ICD-10-CM

## 2025-08-27 RX ORDER — UMECLIDINIUM BROMIDE AND VILANTEROL TRIFENATATE 62.5; 25 UG/1; UG/1
1 POWDER RESPIRATORY (INHALATION) DAILY
Qty: 60 EACH | Refills: 0 | Status: SHIPPED | OUTPATIENT
Start: 2025-08-27

## (undated) DEVICE — SYRINGE MED 10ML POLYPR GEN PURP FLAT TOP LUERLOCK TIP

## (undated) DEVICE — LOOP VES W13MM THK09MM MINI RED SIL FLD REPELLENT

## (undated) DEVICE — ADAPTER, OES PRO OUTER SHEATH TO ELLIK AND SYRINGE: Brand: OLYMPUS

## (undated) DEVICE — Device

## (undated) DEVICE — OPEN-END FLEXI-TIP URETERAL CATHETER: Brand: FLEXI-TIP

## (undated) DEVICE — SHEET,DRAPE,53X77,STERILE: Brand: MEDLINE

## (undated) DEVICE — GLOVE SURG SZ 8 L11.77IN FNGR THK9.8MIL STRW LTX POLYMER

## (undated) DEVICE — BAG DRNGE COMB PK

## (undated) DEVICE — DRAINBAG,ANTI-REFLUX TOWER,L/F,2000ML,LL: Brand: MEDLINE

## (undated) DEVICE — CATHETER F BLLN 5CC 16FR 2 W HYDRGEL COAT LESS TRAUM LUB

## (undated) DEVICE — SYRINGE MED 10ML LUERLOCK TIP W/O SFTY DISP

## (undated) DEVICE — CATHETER URETH 24FR BLLN 30CC STD LTX 3 W TWO OPP DRNGE EYE

## (undated) DEVICE — GAUZE,SPONGE,4"X4",16PLY,XRAY,STRL,LF: Brand: MEDLINE

## (undated) DEVICE — SUTURE NONABSORBABLE MONOFILAMENT 6-0 BV-1 1X30 IN PROLENE 8709H

## (undated) DEVICE — CYSTO: Brand: MEDLINE INDUSTRIES, INC.

## (undated) DEVICE — GOWN SIRUS NONREIN XL W/TWL: Brand: MEDLINE INDUSTRIES, INC.

## (undated) DEVICE — SYRINGE,TOOMEY,IRRIGATION,70CC,STERILE: Brand: MEDLINE

## (undated) DEVICE — SOLUTION IRRIG 2000ML 0.9% SOD CHL USP UROMATIC PLAS CONT

## (undated) DEVICE — SET IRRIG L94IN DIA0.281IN L BOR W/ 2 N VENT SPIK 2 ON/OFF

## (undated) DEVICE — SUTURE NONABSORBABLE MONOFILAMENT 7-0 BV-1 1X24 IN PROLENE 8702H

## (undated) DEVICE — CONTAINER,SPECIMEN,OR STERILE,4OZ: Brand: MEDLINE

## (undated) DEVICE — GEL US 20GM NONIRRITATING OVERWRAPPED FILE PCH TRNSMIT

## (undated) DEVICE — STERILE POLYISOPRENE POWDER-FREE SURGICAL GLOVES WITH EMOLLIENT COATING: Brand: PROTEXIS

## (undated) DEVICE — STERILE POLYISOPRENE POWDER-FREE SURGICAL GLOVES: Brand: PROTEXIS

## (undated) DEVICE — SOLUTION IRRIG 2000ML STRL H2O UROMATIC PLAS CONT USP